# Patient Record
Sex: MALE | Race: WHITE | NOT HISPANIC OR LATINO | Employment: FULL TIME | URBAN - METROPOLITAN AREA
[De-identification: names, ages, dates, MRNs, and addresses within clinical notes are randomized per-mention and may not be internally consistent; named-entity substitution may affect disease eponyms.]

---

## 2017-06-13 ENCOUNTER — TRANSCRIBE ORDERS (OUTPATIENT)
Dept: ADMINISTRATIVE | Facility: HOSPITAL | Age: 67
End: 2017-06-13

## 2017-06-13 DIAGNOSIS — Z85.850 HX OF THYROID CANCER: Primary | ICD-10-CM

## 2017-06-16 LAB — HBA1C MFR BLD HPLC: 5.6 %

## 2017-06-21 ENCOUNTER — HOSPITAL ENCOUNTER (OUTPATIENT)
Dept: ULTRASOUND IMAGING | Facility: HOSPITAL | Age: 67
Discharge: HOME | End: 2017-06-21
Attending: INTERNAL MEDICINE
Payer: MEDICARE

## 2017-06-21 DIAGNOSIS — C73 MALIGNANT NEOPLASM OF THYROID GLAND (HCC): ICD-10-CM

## 2017-06-21 DIAGNOSIS — Z85.850 HX OF THYROID CANCER: ICD-10-CM

## 2017-06-21 PROCEDURE — 76536 US EXAM OF HEAD AND NECK: CPT

## 2017-09-15 LAB — HBA1C MFR BLD HPLC: 5.5 %

## 2018-01-17 ENCOUNTER — TRANSCRIBE ORDERS (OUTPATIENT)
Dept: ADMINISTRATIVE | Facility: HOSPITAL | Age: 68
End: 2018-01-17

## 2018-01-17 DIAGNOSIS — E89.0 POSTSURGICAL HYPOTHYROIDISM: ICD-10-CM

## 2018-01-17 DIAGNOSIS — E73.0 CONGENITAL LACTASE DEFICIENCY: Primary | ICD-10-CM

## 2018-01-19 ENCOUNTER — HOSPITAL ENCOUNTER (OUTPATIENT)
Dept: ULTRASOUND IMAGING | Facility: HOSPITAL | Age: 68
Discharge: HOME | End: 2018-01-19
Attending: INTERNAL MEDICINE
Payer: MEDICARE

## 2018-01-19 ENCOUNTER — GENERIC CONVERSION - ENCOUNTER (OUTPATIENT)
Dept: OTHER | Facility: OTHER | Age: 68
End: 2018-01-19

## 2018-01-19 DIAGNOSIS — E89.0 POSTSURGICAL HYPOTHYROIDISM: ICD-10-CM

## 2018-01-19 DIAGNOSIS — C73 MALIGNANT NEOPLASM OF THYROID GLAND (HCC): ICD-10-CM

## 2018-01-19 DIAGNOSIS — E73.0 CONGENITAL LACTASE DEFICIENCY: ICD-10-CM

## 2018-01-19 PROCEDURE — 76536 US EXAM OF HEAD AND NECK: CPT

## 2018-11-13 ENCOUNTER — TELEPHONE (OUTPATIENT)
Dept: ENDOCRINOLOGY | Facility: HOSPITAL | Age: 68
End: 2018-11-13

## 2018-11-13 DIAGNOSIS — E89.0 POSTOPERATIVE HYPOTHYROIDISM: ICD-10-CM

## 2018-11-13 DIAGNOSIS — C73 THYROID CANCER (HCC): Primary | ICD-10-CM

## 2018-11-13 NOTE — TELEPHONE ENCOUNTER
Pt has pending 1/8/19 appt  Needs new lab slip for thyroid and US thyroid order/ patient had thyroid cancer and had 75% of thyroid removed  Let me know if you need [place] chart

## 2019-01-04 ENCOUNTER — HOSPITAL ENCOUNTER (OUTPATIENT)
Dept: ULTRASOUND IMAGING | Facility: HOSPITAL | Age: 69
Discharge: HOME | End: 2019-01-04
Attending: INTERNAL MEDICINE
Payer: MEDICARE

## 2019-01-04 ENCOUNTER — APPOINTMENT (OUTPATIENT)
Dept: LAB | Facility: HOSPITAL | Age: 69
End: 2019-01-04
Attending: INTERNAL MEDICINE
Payer: MEDICARE

## 2019-01-04 DIAGNOSIS — E89.0 POSTOPERATIVE HYPOTHYROIDISM: ICD-10-CM

## 2019-01-04 DIAGNOSIS — C73 THYROID CANCER (HCC): ICD-10-CM

## 2019-01-04 LAB
T4 FREE SERPL-MCNC: 1.2 NG/DL (ref 0.76–1.46)
TSH SERPL DL<=0.05 MIU/L-ACNC: 0.13 UIU/ML (ref 0.36–3.74)

## 2019-01-04 PROCEDURE — 86800 THYROGLOBULIN ANTIBODY: CPT

## 2019-01-04 PROCEDURE — 84439 ASSAY OF FREE THYROXINE: CPT

## 2019-01-04 PROCEDURE — 76536 US EXAM OF HEAD AND NECK: CPT

## 2019-01-04 PROCEDURE — 84432 ASSAY OF THYROGLOBULIN: CPT

## 2019-01-04 PROCEDURE — 84443 ASSAY THYROID STIM HORMONE: CPT

## 2019-01-04 PROCEDURE — 36415 COLL VENOUS BLD VENIPUNCTURE: CPT

## 2019-01-05 LAB
THYROGLOB AB SERPL-ACNC: <1 IU/ML (ref 0–0.9)
THYROGLOB SERPL-MCNC: 0.5 NG/ML (ref 1.4–29.2)

## 2019-01-08 ENCOUNTER — OFFICE VISIT (OUTPATIENT)
Dept: ENDOCRINOLOGY | Facility: HOSPITAL | Age: 69
End: 2019-01-08
Payer: MEDICARE

## 2019-01-08 VITALS
WEIGHT: 244.4 LBS | HEIGHT: 74 IN | HEART RATE: 68 BPM | SYSTOLIC BLOOD PRESSURE: 122 MMHG | DIASTOLIC BLOOD PRESSURE: 72 MMHG | BODY MASS INDEX: 31.37 KG/M2

## 2019-01-08 DIAGNOSIS — D34 FOLLICULAR ADENOMA: ICD-10-CM

## 2019-01-08 DIAGNOSIS — E89.0 POSTOPERATIVE HYPOTHYROIDISM: ICD-10-CM

## 2019-01-08 DIAGNOSIS — Z85.850 HX OF THYROID CANCER: ICD-10-CM

## 2019-01-08 DIAGNOSIS — C80.1 PAPILLARY CARCINOMA (HCC): Primary | ICD-10-CM

## 2019-01-08 PROBLEM — Z84.89 FH: BARIATRIC SURGERY: Status: ACTIVE | Noted: 2019-01-08

## 2019-01-08 PROBLEM — I51.9 HEART DISEASE: Status: ACTIVE | Noted: 2019-01-08

## 2019-01-08 PROBLEM — N40.0 BPH (BENIGN PROSTATIC HYPERPLASIA): Status: ACTIVE | Noted: 2019-01-08

## 2019-01-08 PROBLEM — Z90.79 H/O PROSTATECTOMY: Status: ACTIVE | Noted: 2019-01-08

## 2019-01-08 PROBLEM — Z85.528 HISTORY OF KIDNEY CANCER: Status: ACTIVE | Noted: 2019-01-08

## 2019-01-08 PROCEDURE — 99204 OFFICE O/P NEW MOD 45 MIN: CPT | Performed by: INTERNAL MEDICINE

## 2019-01-08 RX ORDER — CLOPIDOGREL BISULFATE 75 MG/1
75 TABLET ORAL DAILY
Refills: 1 | COMMUNITY
Start: 2018-10-11

## 2019-01-08 RX ORDER — LEVOTHYROXINE SODIUM 0.15 MG/1
150 TABLET ORAL DAILY
Refills: 3 | COMMUNITY
Start: 2019-01-01 | End: 2019-01-08 | Stop reason: SDUPTHER

## 2019-01-08 RX ORDER — LEVOTHYROXINE SODIUM 0.15 MG/1
150 TABLET ORAL DAILY
Qty: 90 TABLET | Refills: 3 | Status: SHIPPED | OUTPATIENT
Start: 2019-01-08 | End: 2020-01-09 | Stop reason: SDUPTHER

## 2019-01-08 RX ORDER — ALLOPURINOL 100 MG/1
100 TABLET ORAL 2 TIMES DAILY
Refills: 1 | COMMUNITY
Start: 2018-12-06

## 2019-01-08 RX ORDER — ATORVASTATIN CALCIUM 20 MG/1
20 TABLET, FILM COATED ORAL DAILY
Refills: 1 | COMMUNITY
Start: 2018-10-11

## 2019-01-08 RX ORDER — CARVEDILOL 6.25 MG/1
6.25 TABLET ORAL 2 TIMES DAILY
Refills: 9 | COMMUNITY
Start: 2018-12-19

## 2019-01-08 NOTE — PROGRESS NOTES
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  Since 2014 he is being followed at the endocrinologist's office  Plan:    Papillary cancer, follicular adenoma s/p partial thyroidectomy in 2014 - patient feeling well, no complaints  - US head neck lymph node mapping - no evidence of recurrent or metastatic disease  - TSH - 0 131, fT4 - 1 20, thyroglobulin Ab <1 0, thyroglobulin by CLIF 0 5  - continue current management with levothyroxine 150 mcg daily   - f/u in 1 year    CC: Maik Cummings    History of Present Illness     HPI:This is a 80y o -years-old male with past medical history of papillary cancer, 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  Since 2014 he is being followed at the endocrinologist's office  He also has a PMH of gastric sleeve bypass surgery, kidney cancer, BPH status post TURP and heart disease  Patient denies any fatigue, weakness, constipation, diarrhea, excessive weight gain her weight loss, palpitations  Review of Systems   Constitutional: Negative for chills, fatigue and fever  HENT: Negative for congestion, postnasal drip and sore throat  Eyes: Negative for pain  Respiratory: Negative for cough, shortness of breath and wheezing  Cardiovascular: Negative for chest pain and palpitations  Gastrointestinal: Negative for abdominal pain, constipation, diarrhea, nausea and vomiting  Endocrine: Negative for polyuria  Genitourinary: Negative for dysuria  Musculoskeletal: Negative for arthralgias and back pain  Neurological: Negative for dizziness, light-headedness and headaches  Psychiatric/Behavioral: Negative for confusion  The patient is not nervous/anxious  Historical Information   History reviewed   No pertinent past medical history  History reviewed  No pertinent surgical history  Social History   History   Alcohol use Not on file     History   Drug use: Unknown     History   Smoking Status    Former Smoker    Types: Cigars, Cigarettes    Quit date: 2017   Smokeless Tobacco    Never Used     Family History:   Family History   Problem Relation Age of Onset    Coronary artery disease Mother     No Known Problems Father     Heart disease Sister     Kidney cancer Sister        Meds/Allergies   Current Outpatient Prescriptions   Medication Sig Dispense Refill    allopurinol (ZYLOPRIM) 100 mg tablet Take 100 mg by mouth 2 (two) times a day  1    atorvastatin (LIPITOR) 20 mg tablet Take 20 mg by mouth daily  1    carvedilol (COREG) 6 25 mg tablet Take 6 25 mg by mouth 2 (two) times a day  9    clopidogrel (PLAVIX) 75 mg tablet Take 75 mg by mouth daily  1    levothyroxine 150 mcg tablet Take 150 mcg by mouth daily  3    Multiple Vitamins-Minerals (BARIATRIC MULTIVITAMINS/IRON PO) Take by mouth       No current facility-administered medications for this visit  No Known Allergies    Objective   Vitals: Blood pressure 122/72, pulse 68, height 6' 2" (1 88 m), weight 111 kg (244 lb 6 4 oz)  Physical Exam   Constitutional: He appears well-developed and well-nourished  HENT:   Head: Normocephalic and atraumatic  Mouth/Throat: No oropharyngeal exudate  Eyes: Pupils are equal, round, and reactive to light  Conjunctivae are normal  Right eye exhibits no discharge  Left eye exhibits no discharge  No scleral icterus  Neck: Neck supple  Cardiovascular: Normal rate, regular rhythm and normal heart sounds  No murmur heard  Pulmonary/Chest: Effort normal and breath sounds normal  No respiratory distress  He has no wheezes  Abdominal: Soft  He exhibits no distension  There is no tenderness  Musculoskeletal: He exhibits no edema  Neurological: He is alert  No cranial nerve deficit     Skin: Skin is warm and dry  He is not diaphoretic  No erythema  Psychiatric: He has a normal mood and affect  The history was obtained from the review of the chart, patient  Lab Results:   Lab Results   Component Value Date/Time    TSH 3RD GENERATON 0 131 (L) 01/04/2019 01:09 PM    Free T4 1 20 01/04/2019 01:09 PM       Imaging Studies:        I have personally reviewed pertinent reports  Portions of the record may have been created with voice recognition software  Occasional wrong word or "sound a like" substitutions may have occurred due to the inherent limitations of voice recognition software  Read the chart carefully and recognize, using context, where substitutions have occurred

## 2019-01-08 NOTE — LETTER
January 8, 2019     Luda Galvan  [address]    Patient: Shon Franks   YOB: 1950   Date of Visit: 1/8/2019       Dear Dr Estefania Borjas: Thank you for referring Slade Thompson to me for evaluation  Below are my notes for this consultation  If you have questions, please do not hesitate to call me  I look forward to following your patient along with you  Sincerely,        Mynor Carbajal,         CC: No Recipients  Benton Whitehead MD  1/8/2019  2:28 PM  Attested   Tessie Rodriguez 80 y o  male MRN: [id number]    Encounter: [id number]      Assessment/Plan     Assessment: This is a 80y o -years-old male with past medical history of papillary cancer, 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  Since 2014 he is being followed at the endocrinologist's office  Plan:    Papillary cancer, follicular adenoma s/p partial thyroidectomy in 2014 - patient feeling well, no complaints  - US head neck lymph node mapping - no evidence of recurrent or metastatic disease  - TSH - 0 131, fT4 - 1 20, thyroglobulin Ab <1 0, thyroglobulin by CLIF 0 5  - continue current management with levothyroxine 150 mcg daily   - f/u in 1 year    CC: Luda Galvan    History of Present Illness     HPI:This is a 80y o -years-old male with past medical history of papillary cancer, 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  Since 2014 he is being followed at the endocrinologist's office  He also has a PMH of gastric sleeve bypass surgery, kidney cancer, BPH status post TURP and heart disease  Patient denies any fatigue, weakness, constipation, diarrhea, excessive weight gain her weight loss, palpitations  Review of Systems   Constitutional: Negative for chills, fatigue and fever  HENT: Negative for congestion, postnasal drip and sore throat  Eyes: Negative for pain     Respiratory: Negative for cough, shortness of breath and wheezing  Cardiovascular: Negative for chest pain and palpitations  Gastrointestinal: Negative for abdominal pain, constipation, diarrhea, nausea and vomiting  Endocrine: Negative for polyuria  Genitourinary: Negative for dysuria  Musculoskeletal: Negative for arthralgias and back pain  Neurological: Negative for dizziness, light-headedness and headaches  Psychiatric/Behavioral: Negative for confusion  The patient is not nervous/anxious  Historical Information   History reviewed  No pertinent past medical history  History reviewed  No pertinent surgical history  Social History   History   Alcohol use Not on file     History   Drug use: Unknown     History   Smoking Status    Former Smoker    Types: Cigars, Cigarettes    Quit date: 2017   Smokeless Tobacco    Never Used     Family History:   Family History   Problem Relation Age of Onset    Coronary artery disease Mother     No Known Problems Father     Heart disease Sister     Kidney cancer Sister        Meds/Allergies   Current Outpatient Prescriptions   Medication Sig Dispense Refill    allopurinol (ZYLOPRIM) 100 mg tablet Take 100 mg by mouth 2 (two) times a day  1    atorvastatin (LIPITOR) 20 mg tablet Take 20 mg by mouth daily  1    carvedilol (COREG) 6 25 mg tablet Take 6 25 mg by mouth 2 (two) times a day  9    clopidogrel (PLAVIX) 75 mg tablet Take 75 mg by mouth daily  1    levothyroxine 150 mcg tablet Take 150 mcg by mouth daily  3    Multiple Vitamins-Minerals (BARIATRIC MULTIVITAMINS/IRON PO) Take by mouth       No current facility-administered medications for this visit  No Known Allergies    Objective   Vitals: Blood pressure 122/72, pulse 68, height 6' 2" (1 88 m), weight 111 kg (244 lb 6 4 oz)  Physical Exam   Constitutional: He appears well-developed and well-nourished  HENT:   Head: Normocephalic and atraumatic  Mouth/Throat: No oropharyngeal exudate     Eyes: Pupils are equal, round, and reactive to light  Conjunctivae are normal  Right eye exhibits no discharge  Left eye exhibits no discharge  No scleral icterus  Neck: Neck supple  Cardiovascular: Normal rate, regular rhythm and normal heart sounds  No murmur heard  Pulmonary/Chest: Effort normal and breath sounds normal  No respiratory distress  He has no wheezes  Abdominal: Soft  He exhibits no distension  There is no tenderness  Musculoskeletal: He exhibits no edema  Neurological: He is alert  No cranial nerve deficit  Skin: Skin is warm and dry  He is not diaphoretic  No erythema  Psychiatric: He has a normal mood and affect  The history was obtained from the review of the chart, patient  Lab Results:   Lab Results   Component Value Date/Time    TSH 3RD GENERATON 0 131 (L) 01/04/2019 01:09 PM    Free T4 1 20 01/04/2019 01:09 PM       Imaging Studies:        I have personally reviewed pertinent reports  Portions of the record may have been created with voice recognition software  Occasional wrong word or "sound a like" substitutions may have occurred due to the inherent limitations of voice recognition software  Read the chart carefully and recognize, using context, where substitutions have occurred  Attestation signed by Galen Pereyra DO at 1/8/2019  2:32 PM (Updated): I have reviewed the notes, assessments, and/or procedures performed by Dr Genoveva Gustafson, I concur with her documentation of Shai Harden  Papillary thyroid cancer status post subtotal thyroidectomy:  Given that from review of records did not indicate he received radioactive iodine and also the fact that there appears a small amount of residual thyroid tissue left, the thyroglobulin level is not undetectable  However, The level does remain quite low and stable and the ultrasound does not show any signs concerning for residual or recurrent disease    Compared to last January, his TSH is in a more reasonable range of 0 1-0 5 as it was almost completely suppressed last year  In the setting of almost 5 years from surgery without signs of residual or recurrent disease as well as his history of PVCs s/p ablation x3, will aim for a TSH of 0 1-0 5 for now instead of complete suppression  We will plan to see him back in 1 year with a TSH, free T4, thyroglobulin antibody, thyroglobulin quantitative just prior  I have also ordered an ultrasound of the neck to be done prior to that appointment  2  Postoperative hypothyroidism:  Plan as above for hypothyroidism

## 2020-01-06 ENCOUNTER — APPOINTMENT (OUTPATIENT)
Dept: ULTRASOUND IMAGING | Facility: HOSPITAL | Age: 70
End: 2020-01-06
Payer: MEDICARE

## 2020-01-06 ENCOUNTER — HOSPITAL ENCOUNTER (OUTPATIENT)
Dept: ULTRASOUND IMAGING | Facility: HOSPITAL | Age: 70
Discharge: HOME | End: 2020-01-06
Payer: MEDICARE

## 2020-01-06 DIAGNOSIS — E89.0 POSTSURGICAL HYPOTHYROIDISM: ICD-10-CM

## 2020-01-06 DIAGNOSIS — C80.1 PAPILLARY CARCINOMA (HCC): ICD-10-CM

## 2020-01-06 DIAGNOSIS — E73.0 CONGENITAL LACTASE DEFICIENCY: ICD-10-CM

## 2020-01-06 PROCEDURE — 76536 US EXAM OF HEAD AND NECK: CPT

## 2020-01-09 ENCOUNTER — OFFICE VISIT (OUTPATIENT)
Dept: ENDOCRINOLOGY | Facility: HOSPITAL | Age: 70
End: 2020-01-09
Payer: MEDICARE

## 2020-01-09 VITALS
WEIGHT: 255.4 LBS | SYSTOLIC BLOOD PRESSURE: 124 MMHG | HEIGHT: 74 IN | HEART RATE: 76 BPM | BODY MASS INDEX: 32.78 KG/M2 | DIASTOLIC BLOOD PRESSURE: 72 MMHG

## 2020-01-09 DIAGNOSIS — Z85.850 HX OF THYROID CANCER: ICD-10-CM

## 2020-01-09 DIAGNOSIS — C80.1 PAPILLARY CARCINOMA (HCC): ICD-10-CM

## 2020-01-09 DIAGNOSIS — E89.0 POSTOPERATIVE HYPOTHYROIDISM: Primary | ICD-10-CM

## 2020-01-09 PROCEDURE — 99214 OFFICE O/P EST MOD 30 MIN: CPT | Performed by: INTERNAL MEDICINE

## 2020-01-09 RX ORDER — LEVOTHYROXINE SODIUM 0.15 MG/1
TABLET ORAL
Qty: 90 TABLET | Refills: 3 | Status: SHIPPED | OUTPATIENT
Start: 2020-01-09 | End: 2020-12-15

## 2020-01-09 NOTE — PROGRESS NOTES
1/9/2020    Assessment/Plan      Diagnoses and all orders for this visit:    Postoperative hypothyroidism    Hx of thyroid cancer        Assessment/Plan:  1  Thyroid cancer:  Thyroglobulin remains stable and minimally detectable  Ultrasound does not show any evidence of residual recurrent disease  We will relax this TSH suppression a little bit more now that he is about 6 years out from his diagnosis  Follow-up in 1 year with labs as ordered above just prior  2  Postoperative hypothyroidism:  I will reduce his levothyroxine 150 mcg dose to 1 tablet 6 days a week and only half tablet on Sundays  Repeat TSH and free T4 in 2 months  Will call with these results  CC:  Follow-up    History of Present Illness     HPI: Katelin Wisdom is a 77y o  year old male with history of papillary thyroid cancer status post subtotal thyroidectomy in 2014 who presents for follow-up appointment  Reviewing records from prior endocrinologist did not indicate he received radioactive iodine treatment  He was followed over time in his prior endocrinologist had him on TSH suppression  He does have a history of PVCs so at his last appointment we relaxed his TSH suppressive goal   Surgical pathology is listed below  For his postoperative hypothyroidism, he continues on levothyroxine 150 mcg daily  He presents today overall feeling well  No palpitations  He states his PVCs for taking care of any has had no more episode since around 2007  Surgical pathology 6/[facility]/14 at [facility]:   Papillary thyroid cancer 1 cm in right lobe  Margins are free of tumor  Small follicular adenoma 0 4 cm in the left lobe  From partial thyroidectomy, tumor was unifocal with both class goal and follicular variant papillary carcinoma  Margins uninvolved by carcinoma  Tumor was totally encapsulated  There was no lymphovascular or extrathyroidal extension or invasion  No nodes were sampled      Review of Systems   Constitutional: Negative for fatigue  HENT: Negative for trouble swallowing and voice change  Eyes: Negative for visual disturbance  Respiratory: Negative for shortness of breath  Cardiovascular: Negative for palpitations and leg swelling  Gastrointestinal: Negative for abdominal pain, nausea and vomiting  Endocrine: Negative for polydipsia and polyuria  Musculoskeletal: Negative for arthralgias and myalgias  Skin: Negative for rash  Neurological: Negative for dizziness, tremors and weakness  Hematological: Negative for adenopathy  Psychiatric/Behavioral: Negative for agitation and confusion  Historical Information   No past medical history on file  No past surgical history on file  Social History   Social History     Substance and Sexual Activity   Alcohol Use Yes    Comment: socially     Social History     Substance and Sexual Activity   Drug Use No     Social History     Tobacco Use   Smoking Status Former Smoker    Types: Cigars, Cigarettes    Last attempt to quit: 2017    Years since quitting: 3 0   Smokeless Tobacco Never Used     Family History:   Family History   Problem Relation Age of Onset    Coronary artery disease Mother     No Known Problems Father     Heart disease Sister     Kidney cancer Sister        Meds/Allergies   Current Outpatient Medications   Medication Sig Dispense Refill    allopurinol (ZYLOPRIM) 100 mg tablet Take 100 mg by mouth 2 (two) times a day  1    atorvastatin (LIPITOR) 20 mg tablet Take 20 mg by mouth daily  1    carvedilol (COREG) 6 25 mg tablet Take 6 25 mg by mouth 2 (two) times a day  9    clopidogrel (PLAVIX) 75 mg tablet Take 75 mg by mouth daily  1    levothyroxine 150 mcg tablet Take 1 tablet (150 mcg total) by mouth daily 90 tablet 3    Multiple Vitamins-Minerals (BARIATRIC MULTIVITAMINS/IRON PO) Take by mouth       No current facility-administered medications for this visit  No Known Allergies    Objective   Vitals:  There were no vitals taken for this visit  Invasive Devices     None                 Physical Exam   Constitutional: He is oriented to person, place, and time  He appears well-developed and well-nourished  No distress  HENT:   Head: Normocephalic and atraumatic  Neck: Normal range of motion  Neck supple  Cardiovascular: Normal rate and regular rhythm  Pulmonary/Chest: Effort normal and breath sounds normal  No respiratory distress  Abdominal: Soft  Bowel sounds are normal    Musculoskeletal: Normal range of motion  He exhibits no edema  Neurological: He is alert and oriented to person, place, and time  He exhibits normal muscle tone  Skin: Skin is warm and dry  No rash noted  He is not diaphoretic  Psychiatric: He has a normal mood and affect  His behavior is normal    Vitals reviewed  The history was obtained from the review of the chart and from the patient  Lab Results:      Recent Results (from the past 02993 hour(s))   TSH, 3rd generation Lab Collect    Collection Time: 01/04/19  1:09 PM   Result Value Ref Range    TSH 3RD GENERATON 0 131 (L) 0 358 - 3 740 uIU/mL   T4, free Lab Collect    Collection Time: 01/04/19  1:09 PM   Result Value Ref Range    Free T4 1 20 0 76 - 1 46 ng/dL   Thyroglobulin    Collection Time: 01/04/19  1:09 PM   Result Value Ref Range    Thyroglobulin Ab <1 0 0 0 - 0 9 IU/mL   Thyroglobulin by CLIF    Collection Time: 01/04/19  1:09 PM   Result Value Ref Range    Thyroglobulin-CLIF 0 5 (L) 1 4 - 29 2 ng/mL     L[facility] on 12/31/2019:  TSH 0 112, free T4 1 88, thyroglobulin antibody less than 1, thyroglobulin quantitative 0 4     1/6/2020:  NECK ULTRASOUND     INDICATION:     C80 1: Malignant (primary) neoplasm, unspecified      COMPARISON:  Neck ultrasound 1/4/2019 and 6/21/2017     FINDINGS:     Ultrasound of the thyroidectomy bed and cervical lymph node chains was performed with a high frequency linear transducer       Lobulated hyperechoic soft tissue in the left postsurgical bed measures 0 6 x 0 4 x 1 cm  I have measured the hyperechoic soft tissue on the dynamic sweeps series  Measurements on the static images appear to include some adjacent scar or possibly   residual thyroid tissue as seen on the transverse sweeps series      Slightly lobulated soft tissue in the region of the left isthmus measured at 1 x 0 6 x 0 9 cm in retrospect appears similar to prior studies as far back as June 2017  This too may represent adjacent scar or possibly residual thyroid tissue      No abnormal soft tissue in the right posterior surgical bed      Lymph nodes maintain normal morphologic contour, echogenicity and short axis dimensions of less than 0 7 cm  No evidence for microcalcification or focal nodularity      IMPRESSION:     No evidence of recurrent or metastatic disease      Lobular soft tissue in the left postsurgical bed and in the region of the left isthmus appears stable as far back as June 2017 and may represent scarring  Correlate with thyroglobulin levels      Workstation performed: [id number]    Future Appointments   Date Time Provider Christian Correa   1/9/2020  7:30 AM Andrey Guerrero DO [facility]       Portions of the record may have been created with voice recognition software  Occasional wrong word or "sound a like" substitutions may have occurred due to the inherent limitations of voice recognition software  Read the chart carefully and recognize, using context, where substitutions have occurred

## 2020-12-15 DIAGNOSIS — C80.1 PAPILLARY CARCINOMA (HCC): ICD-10-CM

## 2020-12-15 RX ORDER — LEVOTHYROXINE SODIUM 0.15 MG/1
TABLET ORAL
Qty: 90 TABLET | Refills: 3 | Status: SHIPPED | OUTPATIENT
Start: 2020-12-15 | End: 2021-01-18 | Stop reason: SDUPTHER

## 2021-01-18 ENCOUNTER — OFFICE VISIT (OUTPATIENT)
Dept: ENDOCRINOLOGY | Facility: HOSPITAL | Age: 71
End: 2021-01-18
Payer: MEDICARE

## 2021-01-18 VITALS
BODY MASS INDEX: 33.98 KG/M2 | HEIGHT: 74 IN | HEART RATE: 76 BPM | DIASTOLIC BLOOD PRESSURE: 90 MMHG | SYSTOLIC BLOOD PRESSURE: 144 MMHG | WEIGHT: 264.8 LBS

## 2021-01-18 DIAGNOSIS — Z85.850 HX OF THYROID CANCER: ICD-10-CM

## 2021-01-18 DIAGNOSIS — E89.0 POSTOPERATIVE HYPOTHYROIDISM: Primary | ICD-10-CM

## 2021-01-18 DIAGNOSIS — C80.1 PAPILLARY CARCINOMA (HCC): ICD-10-CM

## 2021-01-18 PROCEDURE — 99214 OFFICE O/P EST MOD 30 MIN: CPT | Performed by: INTERNAL MEDICINE

## 2021-01-18 RX ORDER — LEVOTHYROXINE SODIUM 0.15 MG/1
TABLET ORAL
Qty: 90 TABLET | Refills: 3 | Status: SHIPPED | OUTPATIENT
Start: 2021-01-18 | End: 2022-01-19 | Stop reason: SDUPTHER

## 2021-01-18 NOTE — PROGRESS NOTES
1/18/2021    Assessment/Plan      Diagnoses and all orders for this visit:    Postoperative hypothyroidism  -     TSH, 3rd generation; Future  -     T4, free; Future  -     TSH, 3rd generation; Future  -     T4, free; Future    Hx of thyroid cancer  -     Thyroglobulin; Future  -     Anti-thyroglobulin antibody; Future  -     Thyroglobulin; Future  -     Anti-thyroglobulin antibody; Future    Papillary carcinoma (HCC)  -     levothyroxine 150 mcg tablet; 1 TAB 6 DAYS OF THE WEEK  1/2 TABS SUNDAYS  Assessment/Plan:    1  Papillary thyroid cancer:  Recent thyroglobulin level remains stable and minimally detectable in the setting of subtotal thyroidectomy without radioactive iodine  Ultrasound from Jan 2020 did not show any signs of residual recurrent disease  Continue to monitor over time  Repeat thyroglobulin panel and 6 months and we will call with the results  Follow-up in 1 year with labs as ordered above just prior  2  Postoperative hypothyroidism:  Overall I think his levels are at a reasonable goal for now  Suggest continue current regimen for now and monitor over time  CC: Follow-up    History of Present Illness     HPI: Tara Varela is a 72y o  year old male with history of  Papillary thyroid cancer status post subtotal thyroidectomy in 2014 who presents for a follow-up appointment  Reviewing records from prior endocrinologist did not indicate he received radioactive iodine treatment  He was followed over time on TSH suppression  He does have history of PVCs so his TSH suppressive goal was relaxed  Surgical pathology listed below  For his postoperative hypothyroidism he continues on levothyroxine 150 mcg  6 days a week and half tablet on Sundays  He presents today overall feeling well  No new health issues or symptoms of concern  Denies palpitations, tachycardia    No unintentional weight loss though he has gained weight likely due to lifestyle over the past year so     Surgical pathology 14 at [facility]:   Papillary thyroid cancer 1 cm in right lobe   Margins are free of tumor   Small follicular adenoma 0 4 cm in the left lobe   From partial thyroidectomy, tumor was unifocal with both class goal and follicular variant papillary carcinoma   Margins uninvolved by carcinoma   Tumor was totally encapsulated   There was no lymphovascular or extrathyroidal extension or invasion   No nodes were sampled  Review of Systems   Constitutional: Negative for fatigue  HENT: Negative for trouble swallowing and voice change  Eyes: Negative for visual disturbance  Respiratory: Negative for shortness of breath  Cardiovascular: Negative for palpitations and leg swelling  Gastrointestinal: Negative for abdominal pain, nausea and vomiting  Endocrine: Negative for polydipsia and polyuria  Musculoskeletal: Negative for arthralgias and myalgias  Skin: Negative for rash  Neurological: Negative for dizziness, tremors and weakness  Hematological: Negative for adenopathy  Psychiatric/Behavioral: Negative for agitation and confusion  Historical Information   History reviewed  No pertinent past medical history  History reviewed  No pertinent surgical history    Social History   Social History     Substance and Sexual Activity   Alcohol Use Yes    Comment: socially     Social History     Substance and Sexual Activity   Drug Use No     Social History     Tobacco Use   Smoking Status Former Smoker    Types: [de-identified], Cigarettes    Quit date:     Years since quittin 0   Smokeless Tobacco Never Used     Family History:   Family History   Problem Relation Age of Onset    Coronary artery disease Mother     No Known Problems Father     Heart disease Sister     Kidney cancer Sister        Meds/Allergies   Current Outpatient Medications   Medication Sig Dispense Refill    allopurinol (ZYLOPRIM) 100 mg tablet Take 100 mg by mouth 2 (two) times a day  1    atorvastatin (LIPITOR) 20 mg tablet Take 20 mg by mouth daily  1    carvedilol (COREG) 6 25 mg tablet Take 6 25 mg by mouth 2 (two) times a day  9    clopidogrel (PLAVIX) 75 mg tablet Take 75 mg by mouth daily  1    levothyroxine 150 mcg tablet 1 TAB 6 DAYS OF THE WEEK  1/2 TABS SUNDAYS  90 tablet 3    Multiple Vitamins-Minerals (BARIATRIC MULTIVITAMINS/IRON PO) Take by mouth       No current facility-administered medications for this visit  No Known Allergies    Objective   Vitals: Blood pressure 144/90, pulse 76, height 6' 2" (1 88 m), weight 120 kg (264 lb 12 8 oz)  Invasive Devices     None                 Repeat blood pressure by myself measured about 118/78    Physical Exam  Vitals signs reviewed  Constitutional:       General: He is not in acute distress  Appearance: He is well-developed  He is not diaphoretic  HENT:      Head: Normocephalic and atraumatic  Eyes:      Conjunctiva/sclera: Conjunctivae normal       Pupils: Pupils are equal, round, and reactive to light  Neck:      Musculoskeletal: Normal range of motion and neck supple  Thyroid: No thyromegaly  Cardiovascular:      Rate and Rhythm: Normal rate and regular rhythm  Pulmonary:      Effort: Pulmonary effort is normal  No respiratory distress  Breath sounds: Normal breath sounds  Abdominal:      General: Bowel sounds are normal       Palpations: Abdomen is soft  Musculoskeletal: Normal range of motion  Skin:     General: Skin is warm and dry  Findings: No rash  Neurological:      Mental Status: He is alert and oriented to person, place, and time  Motor: No abnormal muscle tone  Psychiatric:         Behavior: Behavior normal          The history was obtained from the review of the chart and from the patient      Lab Results:        Labs from [facility] on 01/08/2021:   TSH 0 263, free T4 1 73, 25 hydroxy vitamin-D 39 3, thyroglobulin 0 6, thyroglobulin antibody less than 1    1/6/2020:  NECK ULTRASOUND     INDICATION:     C80 1: Malignant (primary) neoplasm, unspecified      COMPARISON:  Neck ultrasound 1/4/2019 and 6/21/2017     FINDINGS:     Ultrasound of the thyroidectomy bed and cervical lymph node chains was performed with a high frequency linear transducer  Lobulated hyperechoic soft tissue in the left postsurgical bed measures 0 6 x 0 4 x 1 cm  I have measured the hyperechoic soft tissue on the dynamic sweeps series  Measurements on the static images appear to include some adjacent scar or possibly   residual thyroid tissue as seen on the transverse sweeps series      Slightly lobulated soft tissue in the region of the left isthmus measured at 1 x 0 6 x 0 9 cm in retrospect appears similar to prior studies as far back as June 2017  This too may represent adjacent scar or possibly residual thyroid tissue      No abnormal soft tissue in the right posterior surgical bed      Lymph nodes maintain normal morphologic contour, echogenicity and short axis dimensions of less than 0 7 cm  No evidence for microcalcification or focal nodularity      IMPRESSION:     No evidence of recurrent or metastatic disease      Lobular soft tissue in the left postsurgical bed and in the region of the left isthmus appears stable as far back as June 2017 and may represent scarring  Correlate with thyroglobulin levels      Workstation performed: [id number]         No future appointments  Portions of the record may have been created with voice recognition software  Occasional wrong word or "sound a like" substitutions may have occurred due to the inherent limitations of voice recognition software  Read the chart carefully and recognize, using context, where substitutions have occurred

## 2021-01-19 ENCOUNTER — TELEPHONE (OUTPATIENT)
Dept: ADMINISTRATIVE | Facility: OTHER | Age: 71
End: 2021-01-19

## 2021-01-19 NOTE — TELEPHONE ENCOUNTER
Upon review of the In Basket request and the patient's chart, initial outreach has been made via fax, please see Contacts section for details       Thank you  Ricardo Vargas MA

## 2021-01-19 NOTE — TELEPHONE ENCOUNTER
----- Message from [address] [facility] sent at 1/18/2021  7:14 AM EST -----  Regarding: Colonoscopy  01/18/21 7:14 AM    Hello, our patient Elena Lugo has had a Colonoscopy within the last 10 years with [facility] in [place]  Their number is [phone]      Thank you,  [address] [employer] [facility]  [facility] [place]

## 2021-01-19 NOTE — LETTER
Procedure Request Form: Colonoscopy      Date Requested: 21  Patient: Fannie Cipro  Patient : 1950   Referring Provider: Gemma Sites        Date of Procedure ______________________________       The above patient has informed us that they have completed their   most recent Colonoscopy at your facility  Please complete   this form and attach all corresponding procedure reports/results  Comments __________________________________________________________  ____________________________________________________________________  ____________________________________________________________________  ____________________________________________________________________    Facility Completing Procedure _________________________________________    Form Completed By (print name) _______________________________________      Signature __________________________________________________________      These reports are needed for  compliance    Please fax this completed form and a copy of the procedure report to our office located at [place] [address] as soon as possible to [phone] attention [place]: Phone [phone]    We thank you for your assistance in treating our mutual patient

## 2021-01-22 NOTE — TELEPHONE ENCOUNTER
Upon review of the In Basket request we were able to locate, review, and update the patient chart as requested for CRC: Colonoscopy  Any additional questions or concerns should be emailed to the Practice Liaisons via [email or web address] email, please do not reply via In Basket      Thank you  Erika Garcia MA

## 2022-01-19 ENCOUNTER — OFFICE VISIT (OUTPATIENT)
Dept: ENDOCRINOLOGY | Facility: HOSPITAL | Age: 72
End: 2022-01-19
Payer: MEDICARE

## 2022-01-19 VITALS
DIASTOLIC BLOOD PRESSURE: 86 MMHG | HEIGHT: 74 IN | SYSTOLIC BLOOD PRESSURE: 128 MMHG | WEIGHT: 256.6 LBS | BODY MASS INDEX: 32.93 KG/M2 | HEART RATE: 68 BPM

## 2022-01-19 DIAGNOSIS — Z85.850 HX OF THYROID CANCER: ICD-10-CM

## 2022-01-19 DIAGNOSIS — E89.0 POSTOPERATIVE HYPOTHYROIDISM: Primary | ICD-10-CM

## 2022-01-19 DIAGNOSIS — C80.1 PAPILLARY CARCINOMA (HCC): ICD-10-CM

## 2022-01-19 PROCEDURE — 99214 OFFICE O/P EST MOD 30 MIN: CPT | Performed by: INTERNAL MEDICINE

## 2022-01-19 RX ORDER — LEVOTHYROXINE SODIUM 0.15 MG/1
TABLET ORAL
Qty: 90 TABLET | Refills: 3 | Status: SHIPPED | OUTPATIENT
Start: 2022-01-19

## 2022-01-19 RX ORDER — FINASTERIDE 5 MG/1
5 TABLET, FILM COATED ORAL DAILY
COMMUNITY
Start: 2021-12-17

## 2022-01-19 NOTE — PROGRESS NOTES
1/19/2022    Assessment/Plan      Diagnoses and all orders for this visit:    Postoperative hypothyroidism  -     TSH, 3rd generation; Future  -     T4, free; Future  -     TSH, 3rd generation; Future  -     T4, free; Future    Hx of thyroid cancer  -     Thyroglobulin; Future  -     Anti-thyroglobulin antibody; Future  -     Thyroglobulin; Future  -     Anti-thyroglobulin antibody; Future    Other orders  -     finasteride (PROSCAR) 5 mg tablet; Take 5 mg by mouth daily        Assessment/Plan:  1  Thyroid cancer:  Thyroglobulin level is stable  TSH is at an appropriate goal of 0 5-2  Monitor lab work over time  I have given him an order for lab work in 6 months and we will call the results  Follow-up in 1 year with another set of labs just prior  2  Hypothyroidism: Doing well on current regimen  CC: Follow-up    History of Present Illness     HPI: Demarcus Sharma is a 80y o  year old male with history of papillary thyroid cancer status post subtotal thyroidectomy in 2014 who presents for a follow-up appointment  Reviewing records from prior endocrinologist it does not indicate if he received radioactive iodine treatment  The patient was followed with some time on TSH suppression  He does have history of PVCs so TSH suppressive goal was relax  Surgical pathology as listed below  For postoperative hypothyroidism continues on levothyroxine 150 mcg 6 days a week and half tablet Sundays  He presents today overall feeling well  No new health issues or symptoms of concern  No palpitations or concerns regarding PVCs      Surgical pathology 6/25/14 at [facility]:   Papillary thyroid cancer 1 cm in right lobe   Margins are free of tumor   Small follicular adenoma 0 4 cm in the left lobe   From partial thyroidectomy, tumor was unifocal with both class goal and follicular variant papillary carcinoma   Margins uninvolved by carcinoma   Tumor was totally encapsulated   There was no lymphovascular or extrathyroidal extension or invasion   No nodes were sampled  Review of Systems   Constitutional: Negative for fatigue  HENT: Negative for trouble swallowing and voice change  Eyes: Negative for visual disturbance  Respiratory: Negative for shortness of breath  Cardiovascular: Negative for palpitations and leg swelling  Gastrointestinal: Negative for abdominal pain, nausea and vomiting  Endocrine: Negative for polydipsia and polyuria  Musculoskeletal: Negative for arthralgias and myalgias  Skin: Negative for rash  Neurological: Negative for dizziness, tremors and weakness  Hematological: Negative for adenopathy  Psychiatric/Behavioral: Negative for agitation and confusion  Historical Information   History reviewed  No pertinent past medical history  History reviewed  No pertinent surgical history  Social History   Social History     Substance and Sexual Activity   Alcohol Use Yes    Comment: socially     Social History     Substance and Sexual Activity   Drug Use No     Social History     Tobacco Use   Smoking Status Former Smoker    Types: [de-identified], Cigarettes    Quit date:     Years since quittin 0   Smokeless Tobacco Never Used     Family History:   Family History   Problem Relation Age of Onset    Coronary artery disease Mother     No Known Problems Father     Heart disease Sister     Kidney cancer Sister        Meds/Allergies   Current Outpatient Medications   Medication Sig Dispense Refill    allopurinol (ZYLOPRIM) 100 mg tablet Take 100 mg by mouth 2 (two) times a day  1    atorvastatin (LIPITOR) 20 mg tablet Take 20 mg by mouth daily  1    carvedilol (COREG) 6 25 mg tablet Take 6 25 mg by mouth 2 (two) times a day  9    clopidogrel (PLAVIX) 75 mg tablet Take 75 mg by mouth daily  1    finasteride (PROSCAR) 5 mg tablet Take 5 mg by mouth daily      levothyroxine 150 mcg tablet 1 TAB 6 DAYS OF THE WEEK  1/2 TABS SUNDAYS   90 tablet 3    Multiple Vitamins-Minerals (BARIATRIC MULTIVITAMINS/IRON PO) Take by mouth       No current facility-administered medications for this visit  No Known Allergies    Objective   Vitals: Blood pressure 128/86, pulse 68, height 6' 2" (1 88 m), weight 116 kg (256 lb 9 6 oz)  Invasive Devices  Report    None                 Physical Exam  Vitals reviewed  Constitutional:       General: He is not in acute distress  Appearance: He is well-developed  He is not diaphoretic  HENT:      Head: Normocephalic and atraumatic  Eyes:      Conjunctiva/sclera: Conjunctivae normal       Pupils: Pupils are equal, round, and reactive to light  Neck:      Thyroid: No thyromegaly  Cardiovascular:      Rate and Rhythm: Normal rate and regular rhythm  Pulmonary:      Effort: Pulmonary effort is normal  No respiratory distress  Breath sounds: Normal breath sounds  Abdominal:      General: Bowel sounds are normal       Palpations: Abdomen is soft  Musculoskeletal:         General: Normal range of motion  Cervical back: Normal range of motion and neck supple  Skin:     General: Skin is warm and dry  Findings: No rash  Neurological:      Mental Status: He is alert and oriented to person, place, and time  Motor: No abnormal muscle tone  Psychiatric:         Behavior: Behavior normal          The history was obtained from the review of the chart and from the patient  Lab Results:      Labs from [address] [place] [address] on 01/13/2022:   Thyroglobulin antibody less than 1, thyroglobulin 0 7, TSH 0 677      No future appointments  Portions of the record may have been created with voice recognition software  Occasional wrong word or "sound a like" substitutions may have occurred due to the inherent limitations of voice recognition software  Read the chart carefully and recognize, using context, where substitutions have occurred

## 2022-04-27 ENCOUNTER — PREP FOR PROCEDURE (OUTPATIENT)
Dept: GASTROENTEROLOGY | Facility: CLINIC | Age: 72
End: 2022-04-27

## 2022-04-27 ENCOUNTER — OFFICE VISIT (OUTPATIENT)
Dept: GASTROENTEROLOGY | Facility: CLINIC | Age: 72
End: 2022-04-27
Payer: MEDICARE

## 2022-04-27 ENCOUNTER — TELEPHONE (OUTPATIENT)
Dept: GASTROENTEROLOGY | Facility: CLINIC | Age: 72
End: 2022-04-27

## 2022-04-27 VITALS
SYSTOLIC BLOOD PRESSURE: 124 MMHG | DIASTOLIC BLOOD PRESSURE: 84 MMHG | WEIGHT: 258.8 LBS | BODY MASS INDEX: 35.05 KG/M2 | HEIGHT: 72 IN

## 2022-04-27 DIAGNOSIS — Z86.010 PERSONAL HISTORY OF COLONIC POLYPS: Primary | ICD-10-CM

## 2022-04-27 PROBLEM — Z86.0100 PERSONAL HISTORY OF COLONIC POLYPS: Status: ACTIVE | Noted: 2022-04-27

## 2022-04-27 PROCEDURE — 99204 OFFICE O/P NEW MOD 45 MIN: CPT | Performed by: INTERNAL MEDICINE

## 2022-04-27 RX ORDER — POLYETHYLENE GLYCOL 3350, SODIUM SULFATE ANHYDROUS, SODIUM BICARBONATE, SODIUM CHLORIDE, POTASSIUM CHLORIDE 236; 22.74; 6.74; 5.86; 2.97 G/4L; G/4L; G/4L; G/4L; G/4L
4000 POWDER, FOR SOLUTION ORAL ONCE
Qty: 4000 ML | Refills: 0 | OUTPATIENT
Start: 2022-04-27 | End: 2022-06-14

## 2022-04-27 NOTE — LETTER
April 27, 2022     [address] [facility]  [address] [facility]    Patient: Theo Palomino   YOB: 1950   Date of Visit: 4/27/2022       Dear Dr Shen Ortiz: Thank you for referring Prema Dorian to me for evaluation  Below are my notes for this consultation  If you have questions, please do not hesitate to call me  I look forward to following your patient along with you  Sincerely,        Blayne Rubalcava MD        CC: Ludwin Rubalcava MD  4/27/2022 11:10 AM  Sign when Signing Visit  [address] - Outpatient Consultation  Maynor Rodriguez 80 y o  male MRN: [id number]  Encounter: [id number]          ASSESSMENT AND PLAN:      1  Personal history of colonic polyps  Previous colonoscopies at [facility]  Got letter that he was due for his follow-up exam   - colonoscopy at [facility]    2  Chronic antiplatelet therapy  On Plavix per Dr Corwin Garcia  - will hold Plavix for 5 days if okay with Dr Mariaelena isidro  Patient understands the potential thromboembolic risks  ______________________________________________________________________    HPI:  Patient is seen in the office today for evaluation  He has been getting colonoscopies every 5 years by Dr Yonas Mandel in [facility]  He thinks he gets it every 5 years because of polyps  He is due for his follow-up exam   Since Dr Chanel Guerra is no longer at [facility] and members of his family come here he decided to switch practice  From a GI standpoint he is feeling well  He moves his bowels regularly  He denies any diarrhea, abdominal pain, or GI bleeding  His weight has been slowly increasing following his dramatic weight loss after his gastric bypass surgery  He follows with Dr Mariaelena isidro secondary to PVCs  His he has had 3 ablations    He is currently on Plavix but denies having coronary artery disease or a CVA      REVIEW OF SYSTEMS:    CONSTITUTIONAL: Denies any fever, chills, rigors, and weight loss  HEENT: No earache or tinnitus  Denies hearing loss or visual disturbances  CARDIOVASCULAR: No chest pain or palpitations  RESPIRATORY: Denies any cough, hemoptysis, shortness of breath or dyspnea on exertion  GASTROINTESTINAL: As noted in the History of Present Illness  GENITOURINARY: No problems with urination  Denies any hematuria or dysuria  NEUROLOGIC: No dizziness or vertigo, denies headaches  MUSCULOSKELETAL: Denies any muscle or joint pain  SKIN: Denies skin rashes or itching  ENDOCRINE: Denies excessive thirst  Denies intolerance to heat or cold  PSYCHOSOCIAL: Denies depression or anxiety  Denies any recent memory loss         Historical Information   Past Medical History:   Diagnosis Date    Colon polyp     History of kidney cancer     Hyperlipidemia     Hypertension     PVC's (premature ventricular contractions)     Thyroid cancer ([facility] [address] )      Past Surgical History:   Procedure Laterality Date    BARIATRIC SURGERY      CARDIAC CATHETERIZATION      CARDIAC ELECTROPHYSIOLOGY MAPPING AND ABLATION      COLONOSCOPY      HERNIA REPAIR      REPLACEMENT TOTAL KNEE Bilateral     TONSILLECTOMY      TRANSURETHRAL RESECTION OF PROSTATE      UPPER GASTROINTESTINAL ENDOSCOPY      WRIST SURGERY       Social History   Social History     Substance and Sexual Activity   Alcohol Use Yes    Comment: socially     Social History     Substance and Sexual Activity   Drug Use No     Social History     Tobacco Use   Smoking Status Former Smoker    Types: [de-identified], Cigarettes    Quit date:     Years since quittin 3   Smokeless Tobacco Never Used     Family History   Problem Relation Age of Onset    Coronary artery disease Mother     No Known Problems Father     Heart disease Sister     Kidney cancer Sister     Colon cancer Neg Hx        Meds/Allergies       Current Outpatient Medications:     allopurinol (ZYLOPRIM) 100 mg tablet    atorvastatin (LIPITOR) 20 mg tablet    carvedilol (COREG) 6 25 mg tablet    clopidogrel (PLAVIX) 75 mg tablet    levothyroxine 150 mcg tablet    Multiple Vitamins-Minerals (BARIATRIC MULTIVITAMINS/IRON PO)    finasteride (PROSCAR) 5 mg tablet    polyethylene glycol (Golytely) 4000 mL solution    No Known Allergies        Objective     Blood pressure 124/84, height 6' (1 829 m), weight 117 kg (258 lb 12 8 oz)  Body mass index is 35 1 kg/m²  PHYSICAL EXAM:      General Appearance:   Alert, cooperative, no distress   HEENT:   Normocephalic, atraumatic, anicteric      Neck:  Supple, symmetrical, trachea midline   Lungs:   Clear to auscultation bilaterally; no rales, rhonchi or wheezing; respirations unlabored    Heart[de-identified]   Regular rate and rhythm; no murmur, rub, or gallop  Abdomen:   Soft, non-tender, non-distended; normal bowel sounds; no masses, no organomegaly    Genitalia:   Deferred    Rectal:   Deferred    Extremities:  No cyanosis, clubbing or edema    Pulses:  2+ and symmetric    Skin:  No jaundice, rashes, or lesions    Lymph nodes:  No palpable cervical lymphadenopathy        Lab Results:   No visits with results within 1 Day(s) from this visit  Latest known visit with results is:   Appointment on 01/04/2019   Component Date Value    TSH 3RD GENERATON 01/04/2019 0 131*    Free T4 01/04/2019 1 20     Thyroglobulin Ab 01/04/2019 <1 0     Thyroglobulin-CLIF 01/04/2019 0 5*         Radiology Results:   No results found    Answers for HPI/ROS submitted by the patient on 4/27/2022  Chronicity: new  Onset: more than 1 month ago  Onset quality: undetermined  Frequency: rarely  Episode duration: 1 hours  Progression since onset: unchanged  Pain location: LLQ  Pain - numeric: 0/10  Pain quality: cramping  Radiates to: does not radiate  anorexia: No  arthralgias: No  belching: Yes  constipation: No  diarrhea: No  dysuria: No  fever: No  flatus: Yes  frequency: No  headaches: No  hematochezia: No  hematuria: No  melena: No  myalgias: No  nausea:  No  weight loss: No  vomiting: No  Aggravated by: nothing  Relieved by: being still

## 2022-04-27 NOTE — PROGRESS NOTES
[id number] [facility]  Karly Rodriguez 80 y o  male MRN: [id number]  Encounter: [id number]          ASSESSMENT AND PLAN:      1  Personal history of colonic polyps  Previous colonoscopies at [facility]  Got letter that he was due for his follow-up exam   - colonoscopy at [facility]    2  Chronic antiplatelet therapy  On Plavix per Dr Jeong Wesson  - will hold Plavix for 5 days if okay with Dr Flakito isidro  Patient understands the potential thromboembolic risks  ______________________________________________________________________    HPI:  Patient is seen in the office today for evaluation  He has been getting colonoscopies every 5 years by Dr Miguel Harding in [facility]  He thinks he gets it every 5 years because of polyps  He is due for his follow-up exam   Since Dr Ortiz Chacon is no longer at [facility] and members of his family come here he decided to switch practice  From a GI standpoint he is feeling well  He moves his bowels regularly  He denies any diarrhea, abdominal pain, or GI bleeding  His weight has been slowly increasing following his dramatic weight loss after his gastric bypass surgery  He follows with Dr Flakito isidro secondary to PVCs  His he has had 3 ablations  He is currently on Plavix but denies having coronary artery disease or a CVA      REVIEW OF SYSTEMS:    CONSTITUTIONAL: Denies any fever, chills, rigors, and weight loss  HEENT: No earache or tinnitus  Denies hearing loss or visual disturbances  CARDIOVASCULAR: No chest pain or palpitations  RESPIRATORY: Denies any cough, hemoptysis, shortness of breath or dyspnea on exertion  GASTROINTESTINAL: As noted in the History of Present Illness  GENITOURINARY: No problems with urination  Denies any hematuria or dysuria  NEUROLOGIC: No dizziness or vertigo, denies headaches  MUSCULOSKELETAL: Denies any muscle or joint pain  SKIN: Denies skin rashes or itching  ENDOCRINE: Denies excessive thirst  Denies intolerance to heat or cold  PSYCHOSOCIAL: Denies depression or anxiety  Denies any recent memory loss  Historical Information   Past Medical History:   Diagnosis Date    Colon polyp     History of kidney cancer     Hyperlipidemia     Hypertension     PVC's (premature ventricular contractions)     Thyroid cancer ([facility] [address] )      Past Surgical History:   Procedure Laterality Date    BARIATRIC SURGERY      CARDIAC CATHETERIZATION      CARDIAC ELECTROPHYSIOLOGY MAPPING AND ABLATION      COLONOSCOPY      HERNIA REPAIR      REPLACEMENT TOTAL KNEE Bilateral     TONSILLECTOMY      TRANSURETHRAL RESECTION OF PROSTATE      UPPER GASTROINTESTINAL ENDOSCOPY      WRIST SURGERY       Social History   Social History     Substance and Sexual Activity   Alcohol Use Yes    Comment: socially     Social History     Substance and Sexual Activity   Drug Use No     Social History     Tobacco Use   Smoking Status Former Smoker    Types: Cigars, Cigarettes    Quit date:     Years since quittin 3   Smokeless Tobacco Never Used     Family History   Problem Relation Age of Onset    Coronary artery disease Mother     No Known Problems Father     Heart disease Sister     Kidney cancer Sister     Colon cancer Neg Hx        Meds/Allergies       Current Outpatient Medications:     allopurinol (ZYLOPRIM) 100 mg tablet    atorvastatin (LIPITOR) 20 mg tablet    carvedilol (COREG) 6 25 mg tablet    clopidogrel (PLAVIX) 75 mg tablet    levothyroxine 150 mcg tablet    Multiple Vitamins-Minerals (BARIATRIC MULTIVITAMINS/IRON PO)    finasteride (PROSCAR) 5 mg tablet    polyethylene glycol (Golytely) 4000 mL solution    No Known Allergies        Objective     Blood pressure 124/84, height 6' (1 829 m), weight 117 kg (258 lb 12 8 oz)  Body mass index is 35 1 kg/m²          PHYSICAL EXAM:      General Appearance:   Alert, cooperative, no distress   HEENT:   Normocephalic, atraumatic, anicteric      Neck:  Supple, symmetrical, trachea midline   Lungs:   Clear to auscultation bilaterally; no rales, rhonchi or wheezing; respirations unlabored    Heart[de-identified]   Regular rate and rhythm; no murmur, rub, or gallop  Abdomen:   Soft, non-tender, non-distended; normal bowel sounds; no masses, no organomegaly    Genitalia:   Deferred    Rectal:   Deferred    Extremities:  No cyanosis, clubbing or edema    Pulses:  2+ and symmetric    Skin:  No jaundice, rashes, or lesions    Lymph nodes:  No palpable cervical lymphadenopathy        Lab Results:   No visits with results within 1 Day(s) from this visit  Latest known visit with results is:   Appointment on 01/04/2019   Component Date Value    TSH 3RD GENERATON 01/04/2019 0 131*    Free T4 01/04/2019 1 20     Thyroglobulin Ab 01/04/2019 <1 0     Thyroglobulin-CLIF 01/04/2019 0 5*         Radiology Results:   No results found  Answers for HPI/ROS submitted by the patient on 4/27/2022  Chronicity: new  Onset: more than 1 month ago  Onset quality: undetermined  Frequency: rarely  Episode duration: 1 hours  Progression since onset: unchanged  Pain location: LLQ  Pain - numeric: 0/10  Pain quality: cramping  Radiates to: does not radiate  anorexia: No  arthralgias: No  belching: Yes  constipation: No  diarrhea: No  dysuria: No  fever: No  flatus: Yes  frequency: No  headaches: No  hematochezia: No  hematuria: No  melena: No  myalgias: No  nausea:  No  weight loss: No  vomiting: No  Aggravated by: nothing  Relieved by: being still

## 2022-04-28 ENCOUNTER — TELEPHONE (OUTPATIENT)
Dept: GASTROENTEROLOGY | Facility: CLINIC | Age: 72
End: 2022-04-28

## 2022-04-28 NOTE — TELEPHONE ENCOUNTER
Francoise from [employer] states they rec'd incomplete info on mutual patient/not sure if problem was on our end or their fax machine; request rec of recent consult note fax'd to her Att: [phone]  Fax'd [facility] note from yesterday  Confirmation rec'd

## 2022-05-03 NOTE — TELEPHONE ENCOUNTER
LVM for pt ok to hold Plavix prior to colon  Last dose is 6/8/22  Asked him to call to confirm he received the message

## 2022-06-06 NOTE — TELEPHONE ENCOUNTER
LVM for pt following up on previous call about Plavix hold  Last dose is 6/8/22  Asked him to call back to confirm

## 2022-06-14 ENCOUNTER — ANESTHESIA (OUTPATIENT)
Dept: GASTROENTEROLOGY | Facility: AMBULATORY SURGERY CENTER | Age: 72
End: 2022-06-14

## 2022-06-14 ENCOUNTER — ANESTHESIA EVENT (OUTPATIENT)
Dept: GASTROENTEROLOGY | Facility: AMBULATORY SURGERY CENTER | Age: 72
End: 2022-06-14

## 2022-06-14 ENCOUNTER — HOSPITAL ENCOUNTER (OUTPATIENT)
Dept: GASTROENTEROLOGY | Facility: AMBULATORY SURGERY CENTER | Age: 72
Discharge: HOME | End: 2022-06-14
Payer: MEDICARE

## 2022-06-14 VITALS
HEART RATE: 61 BPM | HEIGHT: 73 IN | WEIGHT: 258 LBS | BODY MASS INDEX: 34.19 KG/M2 | TEMPERATURE: 97.3 F | OXYGEN SATURATION: 97 % | DIASTOLIC BLOOD PRESSURE: 77 MMHG | SYSTOLIC BLOOD PRESSURE: 122 MMHG | RESPIRATION RATE: 19 BRPM

## 2022-06-14 DIAGNOSIS — Z86.010 PERSONAL HISTORY OF COLONIC POLYPS: ICD-10-CM

## 2022-06-14 PROBLEM — I47.2 V TACH (HCC): Status: ACTIVE | Noted: 2019-01-08

## 2022-06-14 PROBLEM — N18.2 STAGE 2 CHRONIC KIDNEY DISEASE: Status: ACTIVE | Noted: 2019-11-05

## 2022-06-14 PROBLEM — I47.20 V TACH: Status: ACTIVE | Noted: 2019-01-08

## 2022-06-14 PROBLEM — I25.10 CAD (CORONARY ARTERY DISEASE): Status: ACTIVE | Noted: 2022-06-14

## 2022-06-14 PROCEDURE — 88305 TISSUE EXAM BY PATHOLOGIST: CPT | Performed by: PATHOLOGY

## 2022-06-14 PROCEDURE — 45380 COLONOSCOPY AND BIOPSY: CPT | Performed by: INTERNAL MEDICINE

## 2022-06-14 RX ORDER — PROPOFOL 10 MG/ML
INJECTION, EMULSION INTRAVENOUS CONTINUOUS PRN
Status: DISCONTINUED | OUTPATIENT
Start: 2022-06-14 | End: 2022-06-14

## 2022-06-14 RX ORDER — PROPOFOL 10 MG/ML
INJECTION, EMULSION INTRAVENOUS AS NEEDED
Status: DISCONTINUED | OUTPATIENT
Start: 2022-06-14 | End: 2022-06-14

## 2022-06-14 RX ORDER — LIDOCAINE HYDROCHLORIDE 10 MG/ML
INJECTION, SOLUTION EPIDURAL; INFILTRATION; INTRACAUDAL; PERINEURAL AS NEEDED
Status: DISCONTINUED | OUTPATIENT
Start: 2022-06-14 | End: 2022-06-14

## 2022-06-14 RX ORDER — SODIUM CHLORIDE, SODIUM LACTATE, POTASSIUM CHLORIDE, CALCIUM CHLORIDE 600; 310; 30; 20 MG/100ML; MG/100ML; MG/100ML; MG/100ML
125 INJECTION, SOLUTION INTRAVENOUS CONTINUOUS
Status: DISCONTINUED | OUTPATIENT
Start: 2022-06-14 | End: 2022-06-18 | Stop reason: HOSPADM

## 2022-06-14 RX ADMIN — PROPOFOL 120 MCG/KG/MIN: 10 INJECTION, EMULSION INTRAVENOUS at 10:24

## 2022-06-14 RX ADMIN — SODIUM CHLORIDE, SODIUM LACTATE, POTASSIUM CHLORIDE, CALCIUM CHLORIDE 125 ML/HR: 600; 310; 30; 20 INJECTION, SOLUTION INTRAVENOUS at 09:50

## 2022-06-14 RX ADMIN — PROPOFOL 100 MG: 10 INJECTION, EMULSION INTRAVENOUS at 10:24

## 2022-06-14 RX ADMIN — LIDOCAINE HYDROCHLORIDE 50 MG: 10 INJECTION, SOLUTION EPIDURAL; INFILTRATION; INTRACAUDAL; PERINEURAL at 10:24

## 2022-06-14 NOTE — ANESTHESIA POSTPROCEDURE EVALUATION
Post-Op Assessment Note    CV Status:  Stable  Pain Score: 0    Pain management: adequate     Mental Status:  Alert and awake   Hydration Status:  Euvolemic   PONV Controlled:  Controlled   Airway Patency:  Patent      Post Op Vitals Reviewed: Yes      Staff: CRNA         No complications documented      BP   107/64   Temp     Pulse   60   Resp   17   SpO2   99%

## 2022-06-14 NOTE — ANESTHESIA PREPROCEDURE EVALUATION
Procedure:  COLONOSCOPY    Relevant Problems   ANESTHESIA (within normal limits)   (-) History of anesthesia complications      CARDIO   (+) CAD (coronary artery disease)   (+) Essential hypertension   (+) Other and unspecified hyperlipidemia   (-) Chest pain   (-) HUITRON (dyspnea on exertion)      ENDO   (+) Postoperative hypothyroidism      /RENAL   (+) BPH (benign prostatic hyperplasia)   (+) Stage 2 chronic kidney disease      NEURO/PSYCH   (+) History of kidney cancer   (+) Hx of thyroid cancer      PULMONARY   (+) Obstructive sleep apnea (milkd, no CPAP)   (-) Shortness of breath   (-) URI (upper respiratory infection)      Cardiovascular and Mediastinum   (+) V tach (HCC) (s/p ablation)      Other   (+) FH: bariatric surgery        Physical Exam    Airway    Mallampati score: II  TM Distance: >3 FB  Neck ROM: full     Dental   No notable dental hx     Cardiovascular      Pulmonary      Other Findings        Anesthesia Plan  ASA Score- 3     Anesthesia Type- IV sedation with anesthesia with ASA Monitors  Additional Monitors:   Airway Plan:           Plan Factors-Exercise tolerance (METS): >4 METS  Chart reviewed  Existing labs reviewed  Patient summary reviewed  Induction- intravenous  Postoperative Plan-     Informed Consent- Anesthetic plan and risks discussed with patient  I personally reviewed this patient with the CRNA  Discussed and agreed on the Anesthesia Plan with the CRNA  Jennyfer Fuentes

## 2022-06-14 NOTE — H&P
History and Physical - [facility]  Kirsten Rodriguez 80 y o  male MRN: [id number]    HPI: Po Figueroa is a 80y o  year old male who presents for surveillance colonoscopy secondary to personal history of polyps    REVIEW OF SYSTEMS: Per the HPI, and otherwise unremarkable      Historical Information   Past Medical History:   Diagnosis Date    Colon polyp     COVID     Disease of thyroid gland     Heart murmur     History of kidney cancer     Hyperlipidemia     Hypertension     PVC (premature ventricular contraction)     PVC's (premature ventricular contractions)     Rheumatic fever     Thyroid cancer ([address] )      Past Surgical History:   Procedure Laterality Date    BARIATRIC SURGERY      CARDIAC CATHETERIZATION      CARDIAC ELECTROPHYSIOLOGY MAPPING AND ABLATION      COLONOSCOPY      FOOT SURGERY Right     HERNIA REPAIR      umbilical    NEPHRECTOMY Right     REPLACEMENT TOTAL KNEE Bilateral     THYROIDECTOMY      TONSILLECTOMY      TRANSURETHRAL RESECTION OF PROSTATE      UPPER GASTROINTESTINAL ENDOSCOPY      WRIST SURGERY       Social History   Social History     Substance and Sexual Activity   Alcohol Use Yes    Comment: socially     Social History     Substance and Sexual Activity   Drug Use No     Social History     Tobacco Use   Smoking Status Former Smoker    Types: Cigars, Cigarettes    Quit date:     Years since quittin 4   Smokeless Tobacco Never Used     Family History   Problem Relation Age of Onset    Coronary artery disease Mother     No Known Problems Father     Heart disease Sister     Kidney cancer Sister     Colon cancer Neg Hx        Meds/Allergies       Current Outpatient Medications:     allopurinol (ZYLOPRIM) 100 mg tablet    atorvastatin (LIPITOR) 20 mg tablet    carvedilol (COREG) 6 25 mg tablet    levothyroxine 150 mcg tablet    Multiple Vitamins-Minerals (BARIATRIC MULTIVITAMINS/IRON PO)    clopidogrel (PLAVIX) 75 mg tablet    finasteride (PROSCAR) 5 mg tablet    polyethylene glycol (Golytely) 4000 mL solution    Current Facility-Administered Medications:     lactated ringers infusion, 125 mL/hr, Intravenous, Continuous, Continue from Pre-op at 06/14/22 1013    No Known Allergies    Objective     /73   Pulse 64   Temp (!) 97 3 °F (36 3 °C) (Temporal)   Resp 16   Ht 6' 1" (1 854 m)   Wt 117 kg (258 lb)   SpO2 95%   BMI 34 04 kg/m²     PHYSICAL EXAM    Gen: NAD AAOx3  Head: Normocephalic, Atraumatic  CV: S1S2 RRR no m/r/g  CHEST: Clear b/l no c/r/w  ABD: soft, +BS NT/ND  EXT: no edema    ASSESSMENT/PLAN:  This is a 80y o  year old male here for surveillance colonoscopy secondary personal history of polyps, and he is stable and optimized for his procedure

## 2022-08-01 ENCOUNTER — TELEPHONE (OUTPATIENT)
Dept: ENDOCRINOLOGY | Facility: CLINIC | Age: 72
End: 2022-08-01

## 2023-01-18 ENCOUNTER — OFFICE VISIT (OUTPATIENT)
Dept: ENDOCRINOLOGY | Facility: CLINIC | Age: 73
End: 2023-01-18

## 2023-01-18 VITALS
SYSTOLIC BLOOD PRESSURE: 112 MMHG | HEIGHT: 73 IN | HEART RATE: 70 BPM | DIASTOLIC BLOOD PRESSURE: 80 MMHG | BODY MASS INDEX: 34.83 KG/M2 | WEIGHT: 262.8 LBS

## 2023-01-18 DIAGNOSIS — E89.0 POSTOPERATIVE HYPOTHYROIDISM: Primary | ICD-10-CM

## 2023-01-18 DIAGNOSIS — C80.1 PAPILLARY CARCINOMA (HCC): ICD-10-CM

## 2023-01-18 RX ORDER — CEPHALEXIN 500 MG/1
500 CAPSULE ORAL 2 TIMES DAILY
COMMUNITY
Start: 2023-01-13

## 2023-01-18 RX ORDER — LEVOTHYROXINE SODIUM 0.15 MG/1
TABLET ORAL
Qty: 90 TABLET | Refills: 3 | Status: SHIPPED | OUTPATIENT
Start: 2023-01-18

## 2023-01-18 NOTE — PROGRESS NOTES
1/18/2023    Assessment/Plan      Diagnoses and all orders for this visit:    Postoperative hypothyroidism  -     TSH, 3rd generation; Future  -     T4, free; Future    Papillary carcinoma (HCC)  -     Thyroglobulin; Future  -     Anti-thyroglobulin antibody; Future  -     levothyroxine 150 mcg tablet; 1 TAB 6 DAYS OF THE WEEK  1/2 TABS SUNDAYS  Other orders  -     cephalexin (KEFLEX) 500 mg capsule; Take 500 mg by mouth 2 (two) times a day        Assessment/Plan:  #1 thyroid cancer: Thyroglobulin remains stable  We will continue to monitor annually  2   Hypothyroidism: Goal TSH 0 5-2  Continue current regimen  Repeat lab work annually  CC: Follow-up    History of Present Illness     HPI: Guilherme Mclain is a 71y o  year old male with history of papillary thyroid cancer status post subtotal thyroidectomy in 2014 who presents for a follow-up appointment  Reviewing records from prior endocrinologist is not indicated to receive radioactive iodine  The patient was followed with some time on TSH suppression  He does have history of PVCs so TSH suppression was relaxed  Surgical pathology is listed below  He continues on levothyroxine 150 mcg 6 days a week and half tablet Sundays  Feeling well  Had recent TURP procedure  Following closely with urology  No neck compressive symptoms  Surgical pathology 6/25/14 at [facility]:   Papillary thyroid cancer 1 cm in right lobe   Margins are free of tumor   Small follicular adenoma 0 4 cm in the left lobe   From partial thyroidectomy, tumor was unifocal with both class goal and follicular variant papillary carcinoma   Margins uninvolved by carcinoma   Tumor was totally encapsulated   There was no lymphovascular or extrathyroidal extension or invasion   No nodes were sampled  Review of Systems   Constitutional: Negative for fatigue  HENT: Negative for trouble swallowing and voice change  Eyes: Negative for visual disturbance     Respiratory: Negative for shortness of breath  Cardiovascular: Negative for palpitations and leg swelling  Gastrointestinal: Negative for abdominal pain, nausea and vomiting  Endocrine: Negative for polydipsia and polyuria  Musculoskeletal: Negative for arthralgias and myalgias  Skin: Negative for rash  Neurological: Negative for dizziness, tremors and weakness  Hematological: Negative for adenopathy  Psychiatric/Behavioral: Negative for agitation and confusion         Historical Information   Past Medical History:   Diagnosis Date   • Colon polyp    • COVID    • Disease of thyroid gland    • Heart murmur    • History of kidney cancer    • Hyperlipidemia    • Hypertension    • PVC (premature ventricular contraction)    • PVC's (premature ventricular contractions)    • Rheumatic fever    • Thyroid cancer ([facility] [address] )      Past Surgical History:   Procedure Laterality Date   • BARIATRIC SURGERY     • CARDIAC CATHETERIZATION     • CARDIAC ELECTROPHYSIOLOGY MAPPING AND ABLATION     • COLONOSCOPY     • FOOT SURGERY Right    • HERNIA REPAIR      umbilical   • NEPHRECTOMY Right    • REPLACEMENT TOTAL KNEE Bilateral    • THYROIDECTOMY     • TONSILLECTOMY     • TRANSURETHRAL RESECTION OF PROSTATE     • UPPER GASTROINTESTINAL ENDOSCOPY     • WRIST SURGERY       Social History   Social History     Substance and Sexual Activity   Alcohol Use Yes    Comment: socially     Social History     Substance and Sexual Activity   Drug Use No     Social History     Tobacco Use   Smoking Status Former   • Types: Cigars, Cigarettes   • Quit date:    • Years since quittin 0   Smokeless Tobacco Never     Family History:   Family History   Problem Relation Age of Onset   • Coronary artery disease Mother    • No Known Problems Father    • Heart disease Sister    • Kidney cancer Sister    • Colon cancer Neg Hx        Meds/Allergies   Current Outpatient Medications   Medication Sig Dispense Refill   • allopurinol (ZYLOPRIM) 100 mg tablet Take 100 mg by mouth 2 (two) times a day  1   • atorvastatin (LIPITOR) 20 mg tablet Take 20 mg by mouth daily  1   • carvedilol (COREG) 6 25 mg tablet Take 6 25 mg by mouth 2 (two) times a day  9   • cephalexin (KEFLEX) 500 mg capsule Take 500 mg by mouth 2 (two) times a day     • finasteride (PROSCAR) 5 mg tablet Take 5 mg by mouth daily     • levothyroxine 150 mcg tablet 1 TAB 6 DAYS OF THE WEEK  1/2 TABS SUNDAYS  90 tablet 3   • Multiple Vitamins-Minerals (BARIATRIC MULTIVITAMINS/IRON PO) Take by mouth     • clopidogrel (PLAVIX) 75 mg tablet Take 75 mg by mouth daily (Patient not taking: Reported on 1/18/2023)  1     No current facility-administered medications for this visit  No Known Allergies    Objective   Vitals: Blood pressure 112/80, pulse 70, height 6' 1" (1 854 m), weight 119 kg (262 lb 12 8 oz)  Invasive Devices     None                 Physical Exam  Vitals reviewed  Constitutional:       General: He is not in acute distress  Appearance: He is well-developed  He is not diaphoretic  HENT:      Head: Normocephalic and atraumatic  Eyes:      Conjunctiva/sclera: Conjunctivae normal       Pupils: Pupils are equal, round, and reactive to light  Neck:      Thyroid: No thyromegaly  Cardiovascular:      Rate and Rhythm: Normal rate and regular rhythm  Pulmonary:      Effort: Pulmonary effort is normal  No respiratory distress  Breath sounds: Normal breath sounds  Abdominal:      General: Bowel sounds are normal       Palpations: Abdomen is soft  Musculoskeletal:         General: Normal range of motion  Cervical back: Normal range of motion and neck supple  Skin:     General: Skin is warm and dry  Findings: No rash  Neurological:      Mental Status: He is alert and oriented to person, place, and time  Motor: No abnormal muscle tone  Psychiatric:         Behavior: Behavior normal          The history was obtained from the review of the chart and from the patient      Lab Results:      Labs from 1/6/23:  Thyroglobulin antibody less than 1, thyroglobulin 0 6, TSH 2 04    No future appointments  Portions of the record may have been created with voice recognition software  Occasional wrong word or "sound a like" substitutions may have occurred due to the inherent limitations of voice recognition software  Read the chart carefully and recognize, using context, where substitutions have occurred

## 2024-01-02 DIAGNOSIS — C80.1 PAPILLARY CARCINOMA (HCC): ICD-10-CM

## 2024-01-03 RX ORDER — LEVOTHYROXINE SODIUM 0.15 MG/1
TABLET ORAL
Qty: 90 TABLET | Refills: 0 | Status: SHIPPED | OUTPATIENT
Start: 2024-01-03

## 2024-01-17 ENCOUNTER — OFFICE VISIT (OUTPATIENT)
Dept: ENDOCRINOLOGY | Facility: CLINIC | Age: 74
End: 2024-01-17
Payer: MEDICARE

## 2024-01-17 VITALS
DIASTOLIC BLOOD PRESSURE: 78 MMHG | HEIGHT: 73 IN | WEIGHT: 273 LBS | SYSTOLIC BLOOD PRESSURE: 118 MMHG | BODY MASS INDEX: 36.18 KG/M2 | HEART RATE: 70 BPM | OXYGEN SATURATION: 97 %

## 2024-01-17 DIAGNOSIS — Z13.1 DIABETES MELLITUS SCREENING: ICD-10-CM

## 2024-01-17 DIAGNOSIS — R79.9 ABNORMAL FINDING OF BLOOD CHEMISTRY, UNSPECIFIED: ICD-10-CM

## 2024-01-17 DIAGNOSIS — Z85.850 HX OF THYROID CANCER: ICD-10-CM

## 2024-01-17 DIAGNOSIS — C80.1 PAPILLARY CARCINOMA (HCC): ICD-10-CM

## 2024-01-17 DIAGNOSIS — E89.0 POSTOPERATIVE HYPOTHYROIDISM: Primary | ICD-10-CM

## 2024-01-17 PROCEDURE — 99214 OFFICE O/P EST MOD 30 MIN: CPT | Performed by: INTERNAL MEDICINE

## 2024-01-17 RX ORDER — CILOSTAZOL 50 MG/1
50 TABLET ORAL 2 TIMES DAILY
COMMUNITY

## 2024-01-17 RX ORDER — TAMSULOSIN HYDROCHLORIDE 0.4 MG/1
0.4 CAPSULE ORAL
COMMUNITY

## 2024-01-17 RX ORDER — LEVOTHYROXINE SODIUM 0.15 MG/1
TABLET ORAL
Qty: 90 TABLET | Refills: 3 | Status: SHIPPED | OUTPATIENT
Start: 2024-01-17

## 2024-01-17 RX ORDER — MOMETASONE FUROATE 1 MG/G
CREAM TOPICAL DAILY
COMMUNITY

## 2024-01-17 NOTE — PROGRESS NOTES
1/17/2024    Assessment/Plan     Assessment/Plan:  1. Postoperative hypothyroidism  -     TSH, 3rd generation; Future; Expected date: 01/01/2025  -     T4, free; Future; Expected date: 01/01/2025    2. Hx of thyroid cancer  -     Thyroglobulin; Future; Expected date: 01/01/2025  -     Anti-thyroglobulin antibody; Future; Expected date: 01/01/2025    3. Papillary carcinoma (HCC)  -     levothyroxine 150 mcg tablet; 1 TAB 6 DAYS OF THE WEEK. 1/2 TABS SUNDAYS.  -     Thyroglobulin; Future; Expected date: 01/01/2025  -     Anti-thyroglobulin antibody; Future; Expected date: 01/01/2025    4. Diabetes mellitus screening  -     Hemoglobin A1C; Future; Expected date: 01/01/2025    5. Abnormal finding of blood chemistry, unspecified  -     Hemoglobin A1C; Future; Expected date: 01/01/2025      Follow-up for hypothyroidism and thyroid cancer.   The patient's thyroglobulin levels have consistently been low, registering at 0.5.6 previously and currently at 0.6. Annual monitoring of thyroglobulin levels will continue.  Thyroid laboratory work, including TSH and free T4 levels, will also be checked annually.     The patient is advised to ensure a year's supply of his prescription at [facility].     Predisposition to diabetes.   An order for HbA1c will be added due to the patient's predisposition to diabetes. The patient has been educated on the importance of dietary moderation.    CC: Follow-up for hypothyroidism and thyroid cancer.    History of Present Illness     HPI: Trent Rodriguez is a 73 y.o. year old male who presents with a chief complaint of follow-up for hypothyroidism and thyroid cancer.    The patient has a history of kidney and thyroid cancer, diagnosed during the first TURP procedure 10 years ago. He underwent bariatric surgery 7 years ago, but has struggled with weight regain, although his condition has improved. A repeat TURP was performed a year ago. Post-procedure, he experienced prostate bleeding, attributed to the  use of Plavix, prescribed by Dr Ruelas. He has since discontinued Plavix. He visited Dr. Edmundo Watson for his annual check-up a few weeks ago. A previous issue with Plavix-induced bleeding and clotting resulted in a hospital admission. He also developed a urinary tract infection, which has since been resolved. He was put back on catheterization and antibiotic therapy. During a subsequent consultation with saw Dr Ruelas, it was advised to discontinue Plavix. There was a discussion about referring him to [facility] for prostate fusion due to persistent bleeding. Dr Ruelas then replaced Plavix with cilostazol, administered twice daily. Since this medication adjustment, the patient has not experienced any further bleeding issues.    He had a melanoma on his neck, which was excised by Dr. Quinones in [place]. He undergoes biannual dermatological check-ups. His wife observed a suspicious lesion on his neck, prompting him to consult a dermatologist. The dermatologist performed a biopsy and referred him to Dr. Quinones, a dermatological surgeon. The surgeon conducted a dissection procedure within 24 hours after the biopsy, performed in 08/2023.    He underwent a nephrectomy in 05/2014. He returned to wait 6 weeks later for a thyroidectomy, which was performed in 06/2014. A transurethral resection of the prostate (TURP) was conducted 2 weeks post-thyroidectomy. His thyroid levels and thyroglobulin, a thyroid cancer tumor marker, have been consistently monitored. The thyroglobulin levels have remained low, consistently between 0.5 and 0.6, and were again at 0.6 this year, indicating a positive prognosis. Given the residual thyroid tissue, a complete reduction to zero is not anticipated. Dr. Finkelstein, in collaboration with Dr. Siegel, performed a thyroidectomy and noted that the ends were entwined around the carotid arteries. The most recent ultrasound, conducted 3 years ago, revealed no changes in the  residual tissues. Current medical literature suggests that as long as the patient remains asymptomatic, particularly in the neck region, and continues regular lab work, ultrasounds can be deferred until a change in lab results is observed. Therefore, the patient's status remains unchanged at present. He has a scheduled appointment with Dr. Pierre next week for renal evaluation. He reported episodes of hematuria and underwent several tests as directed by Dr. Watson. The proposed treatment plan included experimental use of doxycycline. However, the patient expressed a preference for nephrectomy. He also had a colonoscopy 2 years ago.    He has always had a problem with his weight. His weight decreased significantly at one point, but has since been increasing. He attempts to limit his sugar intake, consuming black and decaffeinated coffee, and abstaining from soda. He has not consumed beer for 8 years.     He is currently employed full-time.     He has received the most recent COVID-19 booster and is interested in obtaining the RSV vaccine.    His sister had 2 episodes of kidney cancer. She passed away 2 years ago. She had coronary problems.    He has received his COVID-19 booster.    01/18/2023  HPI: Trent Rodriguez is a 72 y.o. year old male with history of papillary thyroid cancer status post subtotal thyroidectomy in 2014 who presents for a follow-up appointment.  Reviewing records from prior endocrinologist is not indicated to receive radioactive iodine.  The patient was followed with some time on TSH suppression.  He does have history of PVCs so TSH suppression was relaxed.  Surgical pathology is listed below.  He continues on levothyroxine 150 mcg 6 days a week and half tablet Sundays.  Feeling well.  Had recent TURP procedure.  Following closely with urology.  No neck compressive symptoms.     Surgical pathology 6/25/14 at [facility]:   Papillary thyroid cancer 1 cm in right lobe.  Margins are free of tumor.  Small  follicular adenoma 0.4 cm in the left lobe.  From partial thyroidectomy, tumor was unifocal with both class goal and follicular variant papillary carcinoma.  Margins uninvolved by carcinoma.  Tumor was totally encapsulated.  There was no lymphovascular or extrathyroidal extension or invasion.  No nodes were sampled.    Review of Systems:   Constitutional: Negative for chills, fatigue and fever.   HENT: Negative for trouble swallowing and voice change.    Eyes: Negative for visual disturbance.   Respiratory: Negative for shortness of breath.    Cardiovascular: Negative for chest pain, palpitations and leg swelling.   Gastrointestinal: Negative for abdominal pain, nausea and vomiting.   Endocrine: Negative for polydipsia and polyuria.   Musculoskeletal: Negative for arthralgias and myalgias.   Skin: Negative for rash.   Neurological: Negative for dizziness, tremors and weakness.   Hematological: Negative for adenopathy.   Psychiatric/Behavioral: Negative for agitation and confusion.    Historical Information   Past Medical History:   Diagnosis Date    Colon polyp     COVID     Disease of thyroid gland     Heart murmur     History of kidney cancer     Hyperlipidemia     Hypertension     PVC (premature ventricular contraction)     PVC's (premature ventricular contractions)     Rheumatic fever     Thyroid cancer (HCC)      Past Surgical History:   Procedure Laterality Date    BARIATRIC SURGERY      CARDIAC CATHETERIZATION      CARDIAC ELECTROPHYSIOLOGY MAPPING AND ABLATION      COLONOSCOPY      FOOT SURGERY Right     HERNIA REPAIR      umbilical    NEPHRECTOMY Right     REPLACEMENT TOTAL KNEE Bilateral     THYROIDECTOMY      TONSILLECTOMY      TRANSURETHRAL RESECTION OF PROSTATE      UPPER GASTROINTESTINAL ENDOSCOPY      WRIST SURGERY       Social History   Social History     Substance and Sexual Activity   Alcohol Use Yes    Comment: socially     Social History     Substance and Sexual Activity   Drug Use No     Social  "History     Tobacco Use   Smoking Status Former    Current packs/day: 0.00    Types: Cigars, Cigarettes    Quit date:     Years since quittin.0   Smokeless Tobacco Never     Family History:   Family History   Problem Relation Age of Onset    Coronary artery disease Mother     No Known Problems Father     Heart disease Sister     Kidney cancer Sister     Colon cancer Neg Hx        Meds/Allergies   Current Outpatient Medications   Medication Sig Dispense Refill    allopurinol (ZYLOPRIM) 100 mg tablet Take 100 mg by mouth 2 (two) times a day  1    atorvastatin (LIPITOR) 20 mg tablet Take 20 mg by mouth daily  1    carvedilol (COREG) 6.25 mg tablet Take 6.25 mg by mouth 2 (two) times a day  9    cilostazol (PLETAL) 50 mg tablet Take 50 mg by mouth 2 (two) times a day      levothyroxine 150 mcg tablet 1 TAB 6 DAYS OF THE WEEK. 1/2 TABS SUNDAYS. 90 tablet 3    mometasone (ELOCON) 0.1 % cream Apply topically daily      tamsulosin (FLOMAX) 0.4 mg Take 0.4 mg by mouth daily with dinner      Multiple Vitamins-Minerals (BARIATRIC MULTIVITAMINS/IRON PO) Take by mouth       No current facility-administered medications for this visit.     No Known Allergies    Objective   Vitals: Blood pressure 118/78, pulse 70, height 6' 1\", weight 124 kg (273 lb), SpO2 97%.  Invasive Devices       None                   Physical Exam:  Physical Exam  Constitutional:       General: He  is not in acute distress.     Appearance: He is well-developed. He is not diaphoretic.   HENT:      Head: Normocephalic and atraumatic.      Mouth/Throat:      Pharynx: No oropharyngeal exudate.   Eyes:      General: No scleral icterus.     Conjunctiva/sclera: Conjunctivae normal.      Pupils: Pupils are equal, round, and reactive to light.   Neck:      Thyroid: No thyromegaly.   Cardiovascular:      Rate and Rhythm: Normal rate and regular rhythm.      Heart sounds: No murmur heard.  Pulmonary:      Effort: Pulmonary effort is normal.      Breath sounds: " Normal breath sounds. No wheezing.   Abdominal:      General: Bowel sounds are normal. There is no distension.      Palpations: Abdomen is soft.      Tenderness: There is no abdominal tenderness.   Musculoskeletal:         General: Normal range of motion.      Cervical back: Normal range of motion and neck supple.   Skin:     General: Skin is warm and dry.      Findings: No rash.   Neurological:      Mental Status: He is alert and oriented to person, place, and time.      Motor: No abnormal muscle tone.   Psychiatric:         Behavior: Behavior normal.    The history was obtained from the review of the chart and from the patient.    Lab Results:      Recent Results (from the past 97280 hour(s))   NOVEL CORONAVIRUS (COVID-19), PCR [facility]    Collection Time: 01/05/23  3:13 PM    Specimen: Other    Specimen type and source: Other, Nasopharyngeal structure (body structure)   Result Value Ref Range    SARS-CoV-2 Negative Negative       I have personally reviewed results with the patient.    Laboratory Studies  Laboratories  from [facility] on 01/11/2024:   Thyroglobulin antibody: less than 1  Thyroglobulin: 0.6  TSH :2.5.     Future Appointments   Date Time Provider Department Center   1/17/2025  7:30 AM Son Cochran DO [facility]         Transcribed for Son Cochran DO, by David Grimm on 01/17/24 at 11:27 AM. Powered by Dragon [employer].

## 2024-05-01 ENCOUNTER — RA CDI HCC (OUTPATIENT)
Dept: OTHER | Facility: HOSPITAL | Age: 74
End: 2024-05-01

## 2024-05-01 NOTE — PROGRESS NOTES
"Chart review C80.1 oncology audit    C80.1 (papillary carcinoma) on Problem list. Last noted 1/18/21 \"Ultrasound from Jan 2020 did not show any signs of residual recurrent disease. \" Hx of thyroid and kidney cancer on Problem list.   "

## 2024-05-03 PROBLEM — I12.9 HYPERTENSIVE CHRONIC KIDNEY DISEASE: Status: ACTIVE | Noted: 2024-05-03

## 2024-05-03 PROBLEM — N40.0 BPH (BENIGN PROSTATIC HYPERPLASIA): Status: RESOLVED | Noted: 2019-01-08 | Resolved: 2024-05-03

## 2024-05-03 PROBLEM — C80.1 PAPILLARY CARCINOMA (HCC): Status: RESOLVED | Noted: 2019-01-08 | Resolved: 2024-05-03

## 2024-05-03 PROBLEM — D34 FOLLICULAR ADENOMA: Status: RESOLVED | Noted: 2019-01-08 | Resolved: 2024-05-03

## 2025-01-28 ENCOUNTER — TELEPHONE (OUTPATIENT)
Age: 75
End: 2025-01-28

## 2025-01-28 DIAGNOSIS — E89.0 POSTOPERATIVE HYPOTHYROIDISM: Primary | ICD-10-CM

## 2025-01-28 NOTE — TELEPHONE ENCOUNTER
Patient called stating he went to [facility] to get his labs done and they are . No lab orders in the system for him. Patient would like to go back later today as his appointment is on Friday. Please call patient once labs are ordered and sent to [facility].

## 2025-01-30 ENCOUNTER — RESULTS FOLLOW-UP (OUTPATIENT)
Dept: ENDOCRINOLOGY | Facility: CLINIC | Age: 75
End: 2025-01-30

## 2025-01-31 ENCOUNTER — OFFICE VISIT (OUTPATIENT)
Dept: ENDOCRINOLOGY | Facility: CLINIC | Age: 75
End: 2025-01-31
Payer: MEDICARE

## 2025-01-31 VITALS
DIASTOLIC BLOOD PRESSURE: 62 MMHG | BODY MASS INDEX: 35.01 KG/M2 | WEIGHT: 272.8 LBS | HEIGHT: 74 IN | SYSTOLIC BLOOD PRESSURE: 118 MMHG

## 2025-01-31 DIAGNOSIS — E89.0 POSTOPERATIVE HYPOTHYROIDISM: Primary | ICD-10-CM

## 2025-01-31 DIAGNOSIS — C80.1 PAPILLARY CARCINOMA (HCC): ICD-10-CM

## 2025-01-31 PROCEDURE — G2211 COMPLEX E/M VISIT ADD ON: HCPCS | Performed by: INTERNAL MEDICINE

## 2025-01-31 PROCEDURE — 99214 OFFICE O/P EST MOD 30 MIN: CPT | Performed by: INTERNAL MEDICINE

## 2025-01-31 RX ORDER — LEVOTHYROXINE SODIUM 150 UG/1
TABLET ORAL
Qty: 90 TABLET | Refills: 3 | Status: SHIPPED | OUTPATIENT
Start: 2025-01-31

## 2025-01-31 RX ORDER — ALBUTEROL SULFATE 90 UG/1
INHALANT RESPIRATORY (INHALATION)
COMMUNITY
Start: 2024-09-10

## 2025-01-31 NOTE — PROGRESS NOTES
1/31/2025    Assessment & Plan      Diagnoses and all orders for this visit:    Postoperative hypothyroidism  -     TSH, 3rd generation; Future  -     T4, free; Future  -     Thyroglobulin; Future  -     Anti-thyroglobulin antibody; Future    Papillary carcinoma (HCC)  -     TSH, 3rd generation; Future  -     T4, free; Future  -     Thyroglobulin; Future  -     Anti-thyroglobulin antibody; Future  -     levothyroxine 150 mcg tablet; 1 TAB DAILY. DOSE CHANGE.    Other orders  -     Multiple Vitamins-Minerals (PRESERVISION AREDS 2 PO); 1 cap, Oral, BID, 0 Refill(s)  -     Acetaminophen 500 MG; 650 mg As needed  -     albuterol (PROVENTIL HFA,VENTOLIN HFA) 90 mcg/act inhaler; As needed        Assessment/Plan:  1.  Thyroid cancer: Thyroglobulin has been low and stable.  Will continue to monitor this over time.    2.  Hypothyroidism: Over the years TSH is gradually increased slightly.  We will increase his levothyroxine regimen to 1 tablet all 7 days of the week and repeat labs in about 2 months.  Will contact patient as results are available.  As long as these follow up labs look at goal we will continue annual follow-up visits.      CC: Follow-up    History of Present Illness     HPI: Trent Rodriguez is a 74 y.o. year old male with history of papillary thyroid cancer status post subtotal thyroidectomy in 2014 who presents for a follow-up appointment.  Records from prior endocrinologist it is not indicated he received radioactive iodine.  The patient was followed for some time on TSH suppression.  He does have a history of PVCs since TSH suppression was relaxed.  Hypothyroidism he continues on levothyroxine 150 mcg daily 6 days a week and only half tablet on Sundays.  He presents today overall feeling well.  Since last visit had a diagnosis of melanoma on his eye and had surgery for this at [facility].  Following closely with other specialists including ophthalmology, nephrology.    Surgical pathology 6/25/14 at [facility]:    Papillary thyroid cancer 1 cm in right lobe.  Margins are free of tumor.  Small follicular adenoma 0.4 cm in the left lobe.  From partial thyroidectomy, tumor was unifocal with both class goal and follicular variant papillary carcinoma.  Margins uninvolved by carcinoma.  Tumor was totally encapsulated.  There was no lymphovascular or extrathyroidal extension or invasion.  No nodes were sampled.     Review of Systems   All other systems reviewed and are negative.      Historical Information   Past Medical History:   Diagnosis Date    BPH (benign prostatic hyperplasia) 2019    Colon polyp     COVID     Disease of thyroid gland     Follicular adenoma 2019    S/p partial thyroidectomy     Heart murmur     History of kidney cancer     Hyperlipidemia     Hypertension     Papillary carcinoma (HCC) 2019    S/p partial thyroidectomy     PVC (premature ventricular contraction)     PVC's (premature ventricular contractions)     Rheumatic fever     Thyroid cancer (HCC)      Past Surgical History:   Procedure Laterality Date    BARIATRIC SURGERY      CARDIAC CATHETERIZATION      CARDIAC ELECTROPHYSIOLOGY MAPPING AND ABLATION      COLONOSCOPY      EYE SURGERY Left     cancer removed    FOOT SURGERY Right     HERNIA REPAIR      umbilical    NEPHRECTOMY Right     REPLACEMENT TOTAL KNEE Bilateral     THYROIDECTOMY      TONSILLECTOMY      TRANSURETHRAL RESECTION OF PROSTATE      UPPER GASTROINTESTINAL ENDOSCOPY      WRIST SURGERY       Social History   Social History     Substance and Sexual Activity   Alcohol Use Yes    Comment: socially     Social History     Substance and Sexual Activity   Drug Use No     Social History     Tobacco Use   Smoking Status Former    Current packs/day: 0.00    Types: Cigars, Cigarettes    Quit date:     Years since quittin.0   Smokeless Tobacco Never     Family History:   Family History   Problem Relation Age of Onset    Coronary artery disease Mother     No Known  "Problems Father     Heart disease Sister     Kidney cancer Sister     Colon cancer Neg Hx        Meds/Allergies   Current Outpatient Medications   Medication Sig Dispense Refill    Acetaminophen 500  mg As needed      albuterol (PROVENTIL HFA,VENTOLIN HFA) 90 mcg/act inhaler As needed      allopurinol (ZYLOPRIM) 100 mg tablet Take 100 mg by mouth 2 (two) times a day  1    atorvastatin (LIPITOR) 20 mg tablet Take 20 mg by mouth daily  1    carvedilol (COREG) 6.25 mg tablet Take 6.25 mg by mouth 2 (two) times a day  9    cilostazol (PLETAL) 50 mg tablet Take 50 mg by mouth 2 (two) times a day      levothyroxine 150 mcg tablet 1 TAB DAILY. DOSE CHANGE. 90 tablet 3    mometasone (ELOCON) 0.1 % cream Apply topically daily      Multiple Vitamins-Minerals (BARIATRIC MULTIVITAMINS/IRON PO) Take by mouth      Multiple Vitamins-Minerals (PRESERVISION AREDS 2 PO) 1 cap, Oral, BID, 0 Refill(s)      tamsulosin (FLOMAX) 0.4 mg Take 0.4 mg by mouth daily with dinner       No current facility-administered medications for this visit.     No Known Allergies    Objective   Vitals: Blood pressure 118/62, height 6' 2\" (1.88 m), weight 124 kg (272 lb 12.8 oz).  Invasive Devices       None                   Physical Exam  Vitals reviewed.   Constitutional:       General: He is not in acute distress.     Appearance: He is well-developed. He is not diaphoretic.   HENT:      Head: Normocephalic and atraumatic.   Eyes:      Conjunctiva/sclera: Conjunctivae normal.      Pupils: Pupils are equal, round, and reactive to light.   Neck:      Thyroid: No thyromegaly.   Cardiovascular:      Rate and Rhythm: Normal rate and regular rhythm.   Pulmonary:      Effort: Pulmonary effort is normal. No respiratory distress.      Breath sounds: Normal breath sounds.   Abdominal:      General: Bowel sounds are normal.      Palpations: Abdomen is soft.   Musculoskeletal:         General: Normal range of motion.      Cervical back: Normal range of motion " "and neck supple.   Skin:     General: Skin is warm and dry.      Findings: No rash.   Neurological:      Mental Status: He is alert and oriented to person, place, and time.      Motor: No abnormal muscle tone.   Psychiatric:         Behavior: Behavior normal.         The history was obtained from the review of the chart and from the patient.    Lab Results:      Labs from [facility] on 1/29/25:  TSH 2.7      No future appointments.    Portions of the record may have been created with voice recognition software. Occasional wrong word or \"sound a like\" substitutions may have occurred due to the inherent limitations of voice recognition software. Read the chart carefully and recognize, using context, where substitutions have occurred.    "

## 2025-07-24 ENCOUNTER — APPOINTMENT (OUTPATIENT)
Age: 75
End: 2025-07-24
Attending: INTERNAL MEDICINE
Payer: MEDICARE

## 2025-07-24 DIAGNOSIS — C80.1 PAPILLARY CARCINOMA (HCC): ICD-10-CM

## 2025-07-24 DIAGNOSIS — E89.0 POSTOPERATIVE HYPOTHYROIDISM: ICD-10-CM

## 2025-07-24 LAB
T4 FREE SERPL-MCNC: 1.25 NG/DL (ref 0.61–1.12)
TSH SERPL DL<=0.05 MIU/L-ACNC: 3.47 UIU/ML (ref 0.45–4.5)

## 2025-07-24 PROCEDURE — 84439 ASSAY OF FREE THYROXINE: CPT

## 2025-07-24 PROCEDURE — 36415 COLL VENOUS BLD VENIPUNCTURE: CPT

## 2025-07-24 PROCEDURE — 86800 THYROGLOBULIN ANTIBODY: CPT

## 2025-07-24 PROCEDURE — 84443 ASSAY THYROID STIM HORMONE: CPT

## 2025-07-24 PROCEDURE — 84432 ASSAY OF THYROGLOBULIN: CPT

## 2025-07-25 LAB
THYROGLOB AB SERPL-ACNC: <1 IU/ML (ref 0–0.9)
THYROGLOB SERPL-MCNC: 0.8 NG/ML (ref 1.4–29.2)

## 2025-07-30 PROBLEM — N18.31 CHRONIC KIDNEY DISEASE, STAGE 3A (HCC): Status: ACTIVE | Noted: 2025-07-30

## 2025-07-30 PROBLEM — C69.90 OCULAR MELANOMA (HCC): Status: ACTIVE | Noted: 2025-07-30

## 2025-07-30 PROBLEM — C73 THYROID CANCER (HCC): Status: ACTIVE | Noted: 2025-07-30

## 2025-07-30 PROBLEM — R16.1 SPLENIC MASS: Status: ACTIVE | Noted: 2025-07-30

## 2025-07-30 PROBLEM — K58.9 IRRITABLE BOWEL SYNDROME: Status: ACTIVE | Noted: 2025-07-30

## 2025-07-30 PROBLEM — I42.9 CARDIOMYOPATHY (HCC): Status: ACTIVE | Noted: 2025-07-30

## 2025-07-30 PROBLEM — I48.0 PAROXYSMAL ATRIAL FIBRILLATION (HCC): Status: ACTIVE | Noted: 2025-07-30

## 2025-08-11 ENCOUNTER — OFFICE VISIT (OUTPATIENT)
Age: 75
End: 2025-08-11
Payer: MEDICARE

## 2025-08-14 ENCOUNTER — APPOINTMENT (OUTPATIENT)
Age: 75
End: 2025-08-14
Payer: MEDICARE

## 2025-08-14 ENCOUNTER — TELEPHONE (OUTPATIENT)
Age: 75
End: 2025-08-14